# Patient Record
Sex: MALE | Race: WHITE | NOT HISPANIC OR LATINO | Employment: OTHER | URBAN - METROPOLITAN AREA
[De-identification: names, ages, dates, MRNs, and addresses within clinical notes are randomized per-mention and may not be internally consistent; named-entity substitution may affect disease eponyms.]

---

## 2022-08-10 ENCOUNTER — APPOINTMENT (OUTPATIENT)
Dept: CT IMAGING | Facility: HOSPITAL | Age: 87
End: 2022-08-10

## 2022-08-10 ENCOUNTER — HOSPITAL ENCOUNTER (OUTPATIENT)
Facility: HOSPITAL | Age: 87
Setting detail: OBSERVATION
LOS: 1 days | Discharge: HOME | End: 2022-08-12
Attending: EMERGENCY MEDICINE | Admitting: HOSPITALIST

## 2022-08-10 ENCOUNTER — APPOINTMENT (OUTPATIENT)
Dept: GENERAL RADIOLOGY | Facility: HOSPITAL | Age: 87
End: 2022-08-10

## 2022-08-10 DIAGNOSIS — R41.841 COGNITIVE COMMUNICATION DEFICIT: ICD-10-CM

## 2022-08-10 DIAGNOSIS — I63.9 CEREBROVASCULAR ACCIDENT (CVA), UNSPECIFIED MECHANISM: Primary | ICD-10-CM

## 2022-08-10 DIAGNOSIS — D72.825 BANDEMIA: ICD-10-CM

## 2022-08-10 DIAGNOSIS — U07.1 COVID-19 VIRUS INFECTION: ICD-10-CM

## 2022-08-10 DIAGNOSIS — R47.81 SLURRED SPEECH: ICD-10-CM

## 2022-08-10 DIAGNOSIS — R29.810 FACIAL DROOP: ICD-10-CM

## 2022-08-10 PROBLEM — I25.10 CORONARY ARTERY DISEASE: Status: ACTIVE | Noted: 2022-08-10

## 2022-08-10 PROBLEM — I10 ESSENTIAL HYPERTENSION: Status: ACTIVE | Noted: 2022-08-10

## 2022-08-10 PROBLEM — E78.5 HYPERLIPIDEMIA: Status: ACTIVE | Noted: 2022-08-10

## 2022-08-10 PROBLEM — A41.9 SEPSIS: Status: ACTIVE | Noted: 2022-08-10

## 2022-08-10 PROBLEM — I48.0 PAROXYSMAL ATRIAL FIBRILLATION: Status: ACTIVE | Noted: 2022-08-10

## 2022-08-10 PROBLEM — E11.9 TYPE 2 DIABETES MELLITUS: Status: ACTIVE | Noted: 2022-08-10

## 2022-08-10 LAB
ALBUMIN SERPL-MCNC: 3.8 G/DL (ref 3.5–5.2)
ALBUMIN/GLOB SERPL: 1.1 G/DL
ALP SERPL-CCNC: 67 U/L (ref 39–117)
ALT SERPL W P-5'-P-CCNC: 13 U/L (ref 1–41)
ALT SERPL W P-5'-P-CCNC: 13 U/L (ref 1–41)
ANION GAP SERPL CALCULATED.3IONS-SCNC: 13 MMOL/L (ref 5–15)
APTT PPP: 29.7 SECONDS (ref 22–39)
AST SERPL-CCNC: 13 U/L (ref 1–40)
AST SERPL-CCNC: 13 U/L (ref 1–40)
BACTERIA UR QL AUTO: ABNORMAL /HPF
BASOPHILS # BLD AUTO: 0.11 10*3/MM3 (ref 0–0.2)
BASOPHILS NFR BLD AUTO: 0.7 % (ref 0–1.5)
BILIRUB SERPL-MCNC: 0.5 MG/DL (ref 0–1.2)
BILIRUB UR QL STRIP: NEGATIVE
BUN BLDA-MCNC: 26 MG/DL (ref 8–26)
BUN SERPL-MCNC: 27 MG/DL (ref 8–23)
BUN/CREAT SERPL: 16 (ref 7–25)
CA-I BLDA-SCNC: 1.25 MMOL/L (ref 1.2–1.32)
CALCIUM SPEC-SCNC: 9.2 MG/DL (ref 8.6–10.5)
CHLORIDE BLDA-SCNC: 101 MMOL/L (ref 98–109)
CHLORIDE SERPL-SCNC: 102 MMOL/L (ref 98–107)
CLARITY UR: CLEAR
CO2 BLDA-SCNC: 22 MMOL/L (ref 24–29)
CO2 SERPL-SCNC: 22 MMOL/L (ref 22–29)
COLOR UR: YELLOW
CREAT BLDA-MCNC: 1.5 MG/DL (ref 0.6–1.3)
CREAT SERPL-MCNC: 1.69 MG/DL (ref 0.76–1.27)
CRP SERPL-MCNC: 1.22 MG/DL (ref 0–0.5)
D DIMER PPP FEU-MCNC: 0.59 MCGFEU/ML (ref 0.01–0.5)
D-LACTATE SERPL-SCNC: 2.6 MMOL/L (ref 0.5–2)
DEPRECATED RDW RBC AUTO: 44.4 FL (ref 37–54)
EGFRCR SERPLBLD CKD-EPI 2021: 38.8 ML/MIN/1.73
EGFRCR SERPLBLD CKD-EPI 2021: 44.8 ML/MIN/1.73
EOSINOPHIL # BLD AUTO: 0.06 10*3/MM3 (ref 0–0.4)
EOSINOPHIL NFR BLD AUTO: 0.4 % (ref 0.3–6.2)
ERYTHROCYTE [DISTWIDTH] IN BLOOD BY AUTOMATED COUNT: 14.7 % (ref 12.3–15.4)
FLUAV SUBTYP SPEC NAA+PROBE: NOT DETECTED
FLUBV RNA ISLT QL NAA+PROBE: NOT DETECTED
GLOBULIN UR ELPH-MCNC: 3.4 GM/DL
GLUCOSE BLDC GLUCOMTR-MCNC: 312 MG/DL (ref 70–130)
GLUCOSE SERPL-MCNC: 331 MG/DL (ref 65–99)
GLUCOSE UR STRIP-MCNC: ABNORMAL MG/DL
HCT VFR BLD AUTO: 40.3 % (ref 37.5–51)
HCT VFR BLDA CALC: 41 % (ref 38–51)
HGB BLD-MCNC: 13.8 G/DL (ref 13–17.7)
HGB BLDA-MCNC: 13.9 G/DL (ref 12–17)
HGB UR QL STRIP.AUTO: ABNORMAL
HOLD SPECIMEN: NORMAL
HOLD SPECIMEN: NORMAL
HYALINE CASTS UR QL AUTO: ABNORMAL /LPF
IMM GRANULOCYTES # BLD AUTO: 0.07 10*3/MM3 (ref 0–0.05)
IMM GRANULOCYTES NFR BLD AUTO: 0.4 % (ref 0–0.5)
INR PPP: 1.07 (ref 0.84–1.13)
INR PPP: 1.1 (ref 0.8–1.2)
KETONES UR QL STRIP: NEGATIVE
LDH SERPL-CCNC: 154 U/L (ref 135–225)
LEUKOCYTE ESTERASE UR QL STRIP.AUTO: NEGATIVE
LYMPHOCYTES # BLD AUTO: 1.37 10*3/MM3 (ref 0.7–3.1)
LYMPHOCYTES NFR BLD AUTO: 8.8 % (ref 19.6–45.3)
MCH RBC QN AUTO: 28.3 PG (ref 26.6–33)
MCHC RBC AUTO-ENTMCNC: 34.2 G/DL (ref 31.5–35.7)
MCV RBC AUTO: 82.8 FL (ref 79–97)
MONOCYTES # BLD AUTO: 0.89 10*3/MM3 (ref 0.1–0.9)
MONOCYTES NFR BLD AUTO: 5.7 % (ref 5–12)
NEUTROPHILS NFR BLD AUTO: 13.08 10*3/MM3 (ref 1.7–7)
NEUTROPHILS NFR BLD AUTO: 84 % (ref 42.7–76)
NITRITE UR QL STRIP: NEGATIVE
NRBC BLD AUTO-RTO: 0 /100 WBC (ref 0–0.2)
NT-PROBNP SERPL-MCNC: 230.3 PG/ML (ref 0–1800)
PH UR STRIP.AUTO: <=5 [PH] (ref 5–8)
PLATELET # BLD AUTO: 174 10*3/MM3 (ref 140–450)
PMV BLD AUTO: 9.4 FL (ref 6–12)
POTASSIUM BLDA-SCNC: 4.5 MMOL/L (ref 3.5–4.9)
POTASSIUM SERPL-SCNC: 4.6 MMOL/L (ref 3.5–5.2)
PROCALCITONIN SERPL-MCNC: 0.44 NG/ML (ref 0–0.25)
PROT SERPL-MCNC: 7.2 G/DL (ref 6–8.5)
PROT UR QL STRIP: NEGATIVE
PROTHROMBIN TIME: 13.3 SECONDS (ref 12.8–15.2)
PROTHROMBIN TIME: 13.8 SECONDS (ref 11.4–14.4)
RBC # BLD AUTO: 4.87 10*6/MM3 (ref 4.14–5.8)
RBC # UR STRIP: ABNORMAL /HPF
REF LAB TEST METHOD: ABNORMAL
SARS-COV-2 RNA PNL SPEC NAA+PROBE: DETECTED
SODIUM BLD-SCNC: 137 MMOL/L (ref 138–146)
SODIUM SERPL-SCNC: 137 MMOL/L (ref 136–145)
SP GR UR STRIP: 1.06 (ref 1–1.03)
SQUAMOUS #/AREA URNS HPF: ABNORMAL /HPF
TROPONIN T SERPL-MCNC: <0.01 NG/ML (ref 0–0.03)
UROBILINOGEN UR QL STRIP: ABNORMAL
WBC # UR STRIP: ABNORMAL /HPF
WBC CLUMPS # UR AUTO: ABNORMAL /HPF
WBC NRBC COR # BLD: 15.58 10*3/MM3 (ref 3.4–10.8)
WHOLE BLOOD HOLD COAG: NORMAL
WHOLE BLOOD HOLD SPECIMEN: NORMAL

## 2022-08-10 PROCEDURE — 87636 SARSCOV2 & INF A&B AMP PRB: CPT | Performed by: EMERGENCY MEDICINE

## 2022-08-10 PROCEDURE — 93005 ELECTROCARDIOGRAM TRACING: CPT | Performed by: PHYSICIAN ASSISTANT

## 2022-08-10 PROCEDURE — 80053 COMPREHEN METABOLIC PANEL: CPT | Performed by: EMERGENCY MEDICINE

## 2022-08-10 PROCEDURE — 80047 BASIC METABLC PNL IONIZED CA: CPT

## 2022-08-10 PROCEDURE — 0 IOPAMIDOL PER 1 ML: Performed by: EMERGENCY MEDICINE

## 2022-08-10 PROCEDURE — 84484 ASSAY OF TROPONIN QUANT: CPT | Performed by: EMERGENCY MEDICINE

## 2022-08-10 PROCEDURE — 25010000002 CEFTRIAXONE PER 250 MG: Performed by: EMERGENCY MEDICINE

## 2022-08-10 PROCEDURE — 85014 HEMATOCRIT: CPT

## 2022-08-10 PROCEDURE — 71045 X-RAY EXAM CHEST 1 VIEW: CPT

## 2022-08-10 PROCEDURE — 82248 BILIRUBIN DIRECT: CPT | Performed by: PHYSICIAN ASSISTANT

## 2022-08-10 PROCEDURE — 96365 THER/PROPH/DIAG IV INF INIT: CPT

## 2022-08-10 PROCEDURE — 83605 ASSAY OF LACTIC ACID: CPT | Performed by: EMERGENCY MEDICINE

## 2022-08-10 PROCEDURE — 70496 CT ANGIOGRAPHY HEAD: CPT

## 2022-08-10 PROCEDURE — 85610 PROTHROMBIN TIME: CPT | Performed by: PHYSICIAN ASSISTANT

## 2022-08-10 PROCEDURE — 83880 ASSAY OF NATRIURETIC PEPTIDE: CPT | Performed by: INTERNAL MEDICINE

## 2022-08-10 PROCEDURE — 80076 HEPATIC FUNCTION PANEL: CPT | Performed by: PHYSICIAN ASSISTANT

## 2022-08-10 PROCEDURE — 96366 THER/PROPH/DIAG IV INF ADDON: CPT

## 2022-08-10 PROCEDURE — 70498 CT ANGIOGRAPHY NECK: CPT

## 2022-08-10 PROCEDURE — C9803 HOPD COVID-19 SPEC COLLECT: HCPCS

## 2022-08-10 PROCEDURE — 96367 TX/PROPH/DG ADDL SEQ IV INF: CPT

## 2022-08-10 PROCEDURE — 99285 EMERGENCY DEPT VISIT HI MDM: CPT

## 2022-08-10 PROCEDURE — 87040 BLOOD CULTURE FOR BACTERIA: CPT | Performed by: EMERGENCY MEDICINE

## 2022-08-10 PROCEDURE — 82565 ASSAY OF CREATININE: CPT | Performed by: PHYSICIAN ASSISTANT

## 2022-08-10 PROCEDURE — 99204 OFFICE O/P NEW MOD 45 MIN: CPT

## 2022-08-10 PROCEDURE — 25010000002 VANCOMYCIN 10 G RECONSTITUTED SOLUTION: Performed by: EMERGENCY MEDICINE

## 2022-08-10 PROCEDURE — 86140 C-REACTIVE PROTEIN: CPT | Performed by: PHYSICIAN ASSISTANT

## 2022-08-10 PROCEDURE — 99223 1ST HOSP IP/OBS HIGH 75: CPT | Performed by: INTERNAL MEDICINE

## 2022-08-10 PROCEDURE — 85610 PROTHROMBIN TIME: CPT

## 2022-08-10 PROCEDURE — 85025 COMPLETE CBC W/AUTO DIFF WBC: CPT | Performed by: EMERGENCY MEDICINE

## 2022-08-10 PROCEDURE — 85379 FIBRIN DEGRADATION QUANT: CPT | Performed by: PHYSICIAN ASSISTANT

## 2022-08-10 PROCEDURE — 0042T HC CT CEREBRAL PERFUSION W/WO CONTRAST: CPT

## 2022-08-10 PROCEDURE — 85730 THROMBOPLASTIN TIME PARTIAL: CPT | Performed by: EMERGENCY MEDICINE

## 2022-08-10 PROCEDURE — 82728 ASSAY OF FERRITIN: CPT | Performed by: PHYSICIAN ASSISTANT

## 2022-08-10 PROCEDURE — 70450 CT HEAD/BRAIN W/O DYE: CPT

## 2022-08-10 PROCEDURE — 84145 PROCALCITONIN (PCT): CPT | Performed by: PHYSICIAN ASSISTANT

## 2022-08-10 PROCEDURE — 81001 URINALYSIS AUTO W/SCOPE: CPT | Performed by: EMERGENCY MEDICINE

## 2022-08-10 PROCEDURE — 83615 LACTATE (LD) (LDH) ENZYME: CPT | Performed by: PHYSICIAN ASSISTANT

## 2022-08-10 RX ORDER — SODIUM CHLORIDE 9 MG/ML
75 INJECTION, SOLUTION INTRAVENOUS CONTINUOUS
Status: DISCONTINUED | OUTPATIENT
Start: 2022-08-10 | End: 2022-08-11

## 2022-08-10 RX ORDER — PRAVASTATIN SODIUM 40 MG
40 TABLET ORAL DAILY
COMMUNITY

## 2022-08-10 RX ORDER — DEXAMETHASONE SODIUM PHOSPHATE 4 MG/ML
6 INJECTION, SOLUTION INTRA-ARTICULAR; INTRALESIONAL; INTRAMUSCULAR; INTRAVENOUS; SOFT TISSUE DAILY
Status: DISCONTINUED | OUTPATIENT
Start: 2022-08-10 | End: 2022-08-12 | Stop reason: HOSPADM

## 2022-08-10 RX ORDER — PREGABALIN 100 MG/1
225 CAPSULE ORAL 2 TIMES DAILY
COMMUNITY
End: 2022-08-12 | Stop reason: HOSPADM

## 2022-08-10 RX ORDER — AMLODIPINE BESYLATE 10 MG/1
10 TABLET ORAL DAILY
COMMUNITY

## 2022-08-10 RX ORDER — NICOTINE POLACRILEX 4 MG
15 LOZENGE BUCCAL
Status: DISCONTINUED | OUTPATIENT
Start: 2022-08-10 | End: 2022-08-12 | Stop reason: HOSPADM

## 2022-08-10 RX ORDER — QUETIAPINE FUMARATE 50 MG/1
50 TABLET, FILM COATED ORAL NIGHTLY
COMMUNITY

## 2022-08-10 RX ORDER — INSULIN LISPRO 100 [IU]/ML
0-9 INJECTION, SOLUTION INTRAVENOUS; SUBCUTANEOUS
Status: DISCONTINUED | OUTPATIENT
Start: 2022-08-11 | End: 2022-08-11 | Stop reason: SDUPTHER

## 2022-08-10 RX ORDER — SERTRALINE HYDROCHLORIDE 100 MG/1
150 TABLET, FILM COATED ORAL DAILY
COMMUNITY

## 2022-08-10 RX ORDER — DONEPEZIL HYDROCHLORIDE 10 MG/1
10 TABLET, FILM COATED ORAL 2 TIMES DAILY
COMMUNITY
End: 2022-08-12 | Stop reason: HOSPADM

## 2022-08-10 RX ORDER — ASPIRIN 300 MG/1
300 SUPPOSITORY RECTAL DAILY
Status: DISCONTINUED | OUTPATIENT
Start: 2022-08-10 | End: 2022-08-11

## 2022-08-10 RX ORDER — VALSARTAN 160 MG/1
320 TABLET ORAL DAILY
COMMUNITY

## 2022-08-10 RX ORDER — SODIUM CHLORIDE 0.9 % (FLUSH) 0.9 %
10 SYRINGE (ML) INJECTION AS NEEDED
Status: DISCONTINUED | OUTPATIENT
Start: 2022-08-10 | End: 2022-08-12 | Stop reason: HOSPADM

## 2022-08-10 RX ORDER — TRAMADOL HYDROCHLORIDE 50 MG/1
50 TABLET ORAL EVERY 8 HOURS PRN
COMMUNITY

## 2022-08-10 RX ORDER — DEXTROSE MONOHYDRATE 25 G/50ML
25 INJECTION, SOLUTION INTRAVENOUS
Status: DISCONTINUED | OUTPATIENT
Start: 2022-08-10 | End: 2022-08-12 | Stop reason: HOSPADM

## 2022-08-10 RX ORDER — HYDRALAZINE HYDROCHLORIDE 50 MG/1
50 TABLET, FILM COATED ORAL 2 TIMES DAILY
COMMUNITY
End: 2022-08-19 | Stop reason: SDUPTHER

## 2022-08-10 RX ORDER — MIRTAZAPINE 30 MG/1
30 TABLET, FILM COATED ORAL NIGHTLY
COMMUNITY
End: 2022-08-12 | Stop reason: HOSPADM

## 2022-08-10 RX ORDER — ASPIRIN 325 MG
325 TABLET ORAL DAILY
Status: DISCONTINUED | OUTPATIENT
Start: 2022-08-10 | End: 2022-08-11

## 2022-08-10 RX ORDER — GLIPIZIDE 10 MG/1
10 TABLET, FILM COATED, EXTENDED RELEASE ORAL DAILY
COMMUNITY

## 2022-08-10 RX ORDER — MELOXICAM 7.5 MG/1
7.5 TABLET ORAL CONTINUOUS PRN
COMMUNITY
End: 2022-08-12 | Stop reason: HOSPADM

## 2022-08-10 RX ORDER — PANTOPRAZOLE SODIUM 40 MG/1
40 TABLET, DELAYED RELEASE ORAL DAILY
COMMUNITY

## 2022-08-10 RX ORDER — METOPROLOL TARTRATE 100 MG/1
100 TABLET ORAL DAILY
COMMUNITY

## 2022-08-10 RX ADMIN — IOPAMIDOL 115 ML: 755 INJECTION, SOLUTION INTRAVENOUS at 20:46

## 2022-08-10 RX ADMIN — SODIUM CHLORIDE 1000 ML: 9 INJECTION, SOLUTION INTRAVENOUS at 22:12

## 2022-08-10 RX ADMIN — SODIUM CHLORIDE 1 G: 900 INJECTION INTRAVENOUS at 22:13

## 2022-08-10 RX ADMIN — ASPIRIN 300 MG: 300 SUPPOSITORY RECTAL at 23:00

## 2022-08-10 RX ADMIN — VANCOMYCIN HYDROCHLORIDE 2000 MG: 10 INJECTION, POWDER, LYOPHILIZED, FOR SOLUTION INTRAVENOUS at 22:59

## 2022-08-11 ENCOUNTER — APPOINTMENT (OUTPATIENT)
Dept: CARDIOLOGY | Facility: HOSPITAL | Age: 87
End: 2022-08-11

## 2022-08-11 ENCOUNTER — APPOINTMENT (OUTPATIENT)
Dept: MRI IMAGING | Facility: HOSPITAL | Age: 87
End: 2022-08-11

## 2022-08-11 LAB
ALBUMIN SERPL-MCNC: 3.7 G/DL (ref 3.5–5.2)
ALBUMIN SERPL-MCNC: 4.2 G/DL (ref 3.5–5.2)
ALP SERPL-CCNC: 63 U/L (ref 39–117)
ALP SERPL-CCNC: 72 U/L (ref 39–117)
ALT SERPL W P-5'-P-CCNC: 12 U/L (ref 1–41)
ALT SERPL W P-5'-P-CCNC: 13 U/L (ref 1–41)
ANION GAP SERPL CALCULATED.3IONS-SCNC: 13 MMOL/L (ref 5–15)
ASCENDING AORTA: 3.6 CM
AST SERPL-CCNC: 12 U/L (ref 1–40)
AST SERPL-CCNC: 13 U/L (ref 1–40)
BASOPHILS # BLD AUTO: 0.07 10*3/MM3 (ref 0–0.2)
BASOPHILS NFR BLD AUTO: 0.4 % (ref 0–1.5)
BH CV ECHO MEAS - AO MAX PG: 9.8 MMHG
BH CV ECHO MEAS - AO MEAN PG: 5.4 MMHG
BH CV ECHO MEAS - AO ROOT DIAM: 3.4 CM
BH CV ECHO MEAS - AO V2 MAX: 156.4 CM/SEC
BH CV ECHO MEAS - AO V2 VTI: 35.6 CM
BH CV ECHO MEAS - AVA(I,D): 2.5 CM2
BH CV ECHO MEAS - EDV(CUBED): 88.4 ML
BH CV ECHO MEAS - EDV(MOD-SP2): 113 ML
BH CV ECHO MEAS - EDV(MOD-SP4): 124 ML
BH CV ECHO MEAS - EF(MOD-BP): 62.3 %
BH CV ECHO MEAS - EF(MOD-SP2): 65.8 %
BH CV ECHO MEAS - EF(MOD-SP4): 59.4 %
BH CV ECHO MEAS - ESV(CUBED): 24.6 ML
BH CV ECHO MEAS - ESV(MOD-SP2): 38.6 ML
BH CV ECHO MEAS - ESV(MOD-SP4): 50.4 ML
BH CV ECHO MEAS - FS: 34.7 %
BH CV ECHO MEAS - IVS/LVPW: 0.96 CM
BH CV ECHO MEAS - IVSD: 0.81 CM
BH CV ECHO MEAS - LA DIMENSION: 4 CM
BH CV ECHO MEAS - LAT PEAK E' VEL: 9.8 CM/SEC
BH CV ECHO MEAS - LV MASS(C)D: 115.4 GRAMS
BH CV ECHO MEAS - LV MAX PG: 5.6 MMHG
BH CV ECHO MEAS - LV MEAN PG: 2.5 MMHG
BH CV ECHO MEAS - LV V1 MAX: 118.1 CM/SEC
BH CV ECHO MEAS - LV V1 VTI: 24.4 CM
BH CV ECHO MEAS - LVIDD: 4.5 CM
BH CV ECHO MEAS - LVIDS: 2.9 CM
BH CV ECHO MEAS - LVOT AREA: 3.7 CM2
BH CV ECHO MEAS - LVOT DIAM: 2.17 CM
BH CV ECHO MEAS - LVPWD: 0.83 CM
BH CV ECHO MEAS - MED PEAK E' VEL: 6.7 CM/SEC
BH CV ECHO MEAS - MV A MAX VEL: 111.6 CM/SEC
BH CV ECHO MEAS - MV DEC SLOPE: 431.5 CM/SEC2
BH CV ECHO MEAS - MV DEC TIME: 0.18 MSEC
BH CV ECHO MEAS - MV E MAX VEL: 90.8 CM/SEC
BH CV ECHO MEAS - MV E/A: 0.81
BH CV ECHO MEAS - MV MAX PG: 6.2 MMHG
BH CV ECHO MEAS - MV MEAN PG: 2.6 MMHG
BH CV ECHO MEAS - MV P1/2T: 70.4 MSEC
BH CV ECHO MEAS - MV V2 VTI: 37.2 CM
BH CV ECHO MEAS - MVA(P1/2T): 3.1 CM2
BH CV ECHO MEAS - MVA(VTI): 2.43 CM2
BH CV ECHO MEAS - PA ACC TIME: 0.14 SEC
BH CV ECHO MEAS - PA PR(ACCEL): 15.6 MMHG
BH CV ECHO MEAS - PA V2 MAX: 120.7 CM/SEC
BH CV ECHO MEAS - RAP SYSTOLE: 8 MMHG
BH CV ECHO MEAS - RVSP: 16 MMHG
BH CV ECHO MEAS - SV(LVOT): 90.4 ML
BH CV ECHO MEAS - SV(MOD-SP2): 74.4 ML
BH CV ECHO MEAS - SV(MOD-SP4): 73.6 ML
BH CV ECHO MEAS - TAPSE (>1.6): 2.13 CM
BH CV ECHO MEAS - TR MAX PG: 8.2 MMHG
BH CV ECHO MEAS - TR MAX VEL: 143.3 CM/SEC
BH CV ECHO MEASUREMENTS AVERAGE E/E' RATIO: 11.01
BH CV VAS BP LEFT ARM: NORMAL MMHG
BH CV XLRA - RV BASE: 3.9 CM
BH CV XLRA - RV LENGTH: 6.6 CM
BH CV XLRA - RV MID: 3 CM
BH CV XLRA - TDI S': 14.7 CM/SEC
BILIRUB CONJ SERPL-MCNC: <0.2 MG/DL (ref 0–0.3)
BILIRUB CONJ SERPL-MCNC: <0.2 MG/DL (ref 0–0.3)
BILIRUB INDIRECT SERPL-MCNC: NORMAL MG/DL
BILIRUB INDIRECT SERPL-MCNC: NORMAL MG/DL
BILIRUB SERPL-MCNC: 0.3 MG/DL (ref 0–1.2)
BILIRUB SERPL-MCNC: 0.5 MG/DL (ref 0–1.2)
BUN SERPL-MCNC: 24 MG/DL (ref 8–23)
BUN/CREAT SERPL: 17.4 (ref 7–25)
CALCIUM SPEC-SCNC: 9 MG/DL (ref 8.6–10.5)
CHLORIDE SERPL-SCNC: 104 MMOL/L (ref 98–107)
CHOLEST SERPL-MCNC: 146 MG/DL (ref 0–200)
CO2 SERPL-SCNC: 22 MMOL/L (ref 22–29)
CREAT SERPL-MCNC: 1.38 MG/DL (ref 0.76–1.27)
CREAT SERPL-MCNC: 1.53 MG/DL (ref 0.76–1.27)
D-LACTATE SERPL-SCNC: 1.7 MMOL/L (ref 0.5–2)
D-LACTATE SERPL-SCNC: 2.3 MMOL/L (ref 0.5–2)
DEPRECATED RDW RBC AUTO: 43.9 FL (ref 37–54)
EGFRCR SERPLBLD CKD-EPI 2021: 43.7 ML/MIN/1.73
EGFRCR SERPLBLD CKD-EPI 2021: 49.5 ML/MIN/1.73
EOSINOPHIL # BLD AUTO: 0 10*3/MM3 (ref 0–0.4)
EOSINOPHIL NFR BLD AUTO: 0 % (ref 0.3–6.2)
ERYTHROCYTE [DISTWIDTH] IN BLOOD BY AUTOMATED COUNT: 14.7 % (ref 12.3–15.4)
FERRITIN SERPL-MCNC: 30.13 NG/ML (ref 30–400)
GLUCOSE BLDC GLUCOMTR-MCNC: 282 MG/DL (ref 70–130)
GLUCOSE BLDC GLUCOMTR-MCNC: 330 MG/DL (ref 70–130)
GLUCOSE BLDC GLUCOMTR-MCNC: 343 MG/DL (ref 70–130)
GLUCOSE SERPL-MCNC: 295 MG/DL (ref 65–99)
HBA1C MFR BLD: 10.6 % (ref 4.8–5.6)
HCT VFR BLD AUTO: 38.3 % (ref 37.5–51)
HDLC SERPL-MCNC: 37 MG/DL (ref 40–60)
HGB BLD-MCNC: 13.3 G/DL (ref 13–17.7)
HOLD SPECIMEN: NORMAL
IMM GRANULOCYTES # BLD AUTO: 0.08 10*3/MM3 (ref 0–0.05)
IMM GRANULOCYTES NFR BLD AUTO: 0.5 % (ref 0–0.5)
LDLC SERPL CALC-MCNC: 73 MG/DL (ref 0–100)
LDLC/HDLC SERPL: 1.78 {RATIO}
LEFT ATRIUM VOLUME INDEX: 21.4 ML/M2
LYMPHOCYTES # BLD AUTO: 1.29 10*3/MM3 (ref 0.7–3.1)
LYMPHOCYTES NFR BLD AUTO: 7.9 % (ref 19.6–45.3)
MAXIMAL PREDICTED HEART RATE: 133 BPM
MCH RBC QN AUTO: 28.5 PG (ref 26.6–33)
MCHC RBC AUTO-ENTMCNC: 34.7 G/DL (ref 31.5–35.7)
MCV RBC AUTO: 82 FL (ref 79–97)
MONOCYTES # BLD AUTO: 0.31 10*3/MM3 (ref 0.1–0.9)
MONOCYTES NFR BLD AUTO: 1.9 % (ref 5–12)
NEUTROPHILS NFR BLD AUTO: 14.54 10*3/MM3 (ref 1.7–7)
NEUTROPHILS NFR BLD AUTO: 89.3 % (ref 42.7–76)
NRBC BLD AUTO-RTO: 0 /100 WBC (ref 0–0.2)
PLATELET # BLD AUTO: 188 10*3/MM3 (ref 140–450)
PMV BLD AUTO: 9.7 FL (ref 6–12)
POTASSIUM SERPL-SCNC: 4.8 MMOL/L (ref 3.5–5.2)
PROT SERPL-MCNC: 6.7 G/DL (ref 6–8.5)
PROT SERPL-MCNC: 7.3 G/DL (ref 6–8.5)
QT INTERVAL: 382 MS
QTC INTERVAL: 412 MS
RBC # BLD AUTO: 4.67 10*6/MM3 (ref 4.14–5.8)
SODIUM SERPL-SCNC: 139 MMOL/L (ref 136–145)
STRESS TARGET HR: 113 BPM
TRIGL SERPL-MCNC: 215 MG/DL (ref 0–150)
VLDLC SERPL-MCNC: 36 MG/DL (ref 5–40)
WBC NRBC COR # BLD: 16.29 10*3/MM3 (ref 3.4–10.8)

## 2022-08-11 PROCEDURE — 96375 TX/PRO/DX INJ NEW DRUG ADDON: CPT

## 2022-08-11 PROCEDURE — 85025 COMPLETE CBC W/AUTO DIFF WBC: CPT | Performed by: PHYSICIAN ASSISTANT

## 2022-08-11 PROCEDURE — 25010000002 DEXAMETHASONE PER 1 MG: Performed by: PHYSICIAN ASSISTANT

## 2022-08-11 PROCEDURE — 97166 OT EVAL MOD COMPLEX 45 MIN: CPT

## 2022-08-11 PROCEDURE — 63710000001 INSULIN DETEMIR PER 5 UNITS: Performed by: HOSPITALIST

## 2022-08-11 PROCEDURE — 97161 PT EVAL LOW COMPLEX 20 MIN: CPT

## 2022-08-11 PROCEDURE — 92523 SPEECH SOUND LANG COMPREHEN: CPT

## 2022-08-11 PROCEDURE — 80076 HEPATIC FUNCTION PANEL: CPT | Performed by: PHYSICIAN ASSISTANT

## 2022-08-11 PROCEDURE — G0378 HOSPITAL OBSERVATION PER HR: HCPCS

## 2022-08-11 PROCEDURE — 99232 SBSQ HOSP IP/OBS MODERATE 35: CPT | Performed by: HOSPITALIST

## 2022-08-11 PROCEDURE — 80048 BASIC METABOLIC PNL TOTAL CA: CPT | Performed by: PHYSICIAN ASSISTANT

## 2022-08-11 PROCEDURE — 63710000001 INSULIN LISPRO (HUMAN) PER 5 UNITS: Performed by: PHYSICIAN ASSISTANT

## 2022-08-11 PROCEDURE — 83036 HEMOGLOBIN GLYCOSYLATED A1C: CPT

## 2022-08-11 PROCEDURE — 83605 ASSAY OF LACTIC ACID: CPT | Performed by: EMERGENCY MEDICINE

## 2022-08-11 PROCEDURE — 99214 OFFICE O/P EST MOD 30 MIN: CPT | Performed by: STUDENT IN AN ORGANIZED HEALTH CARE EDUCATION/TRAINING PROGRAM

## 2022-08-11 PROCEDURE — 63710000001 INSULIN LISPRO (HUMAN) PER 5 UNITS: Performed by: HOSPITALIST

## 2022-08-11 PROCEDURE — 93306 TTE W/DOPPLER COMPLETE: CPT

## 2022-08-11 PROCEDURE — 25010000002 REMDESIVIR 100 MG/20ML SOLUTION 1 EACH VIAL: Performed by: PHYSICIAN ASSISTANT

## 2022-08-11 PROCEDURE — 93306 TTE W/DOPPLER COMPLETE: CPT | Performed by: INTERNAL MEDICINE

## 2022-08-11 PROCEDURE — 96361 HYDRATE IV INFUSION ADD-ON: CPT

## 2022-08-11 PROCEDURE — 80061 LIPID PANEL: CPT

## 2022-08-11 PROCEDURE — 82962 GLUCOSE BLOOD TEST: CPT

## 2022-08-11 PROCEDURE — 92610 EVALUATE SWALLOWING FUNCTION: CPT

## 2022-08-11 PROCEDURE — 70551 MRI BRAIN STEM W/O DYE: CPT

## 2022-08-11 PROCEDURE — 63710000001 DEXAMETHASONE PER 0.25 MG: Performed by: PHYSICIAN ASSISTANT

## 2022-08-11 RX ORDER — INSULIN LISPRO 100 [IU]/ML
2 INJECTION, SOLUTION INTRAVENOUS; SUBCUTANEOUS
Status: DISCONTINUED | OUTPATIENT
Start: 2022-08-11 | End: 2022-08-12 | Stop reason: HOSPADM

## 2022-08-11 RX ORDER — PANTOPRAZOLE SODIUM 40 MG/1
40 TABLET, DELAYED RELEASE ORAL DAILY
Status: DISCONTINUED | OUTPATIENT
Start: 2022-08-11 | End: 2022-08-12 | Stop reason: HOSPADM

## 2022-08-11 RX ORDER — DEXTROSE MONOHYDRATE 25 G/50ML
25 INJECTION, SOLUTION INTRAVENOUS
Status: DISCONTINUED | OUTPATIENT
Start: 2022-08-11 | End: 2022-08-11 | Stop reason: SDUPTHER

## 2022-08-11 RX ORDER — MIRTAZAPINE 15 MG/1
30 TABLET, FILM COATED ORAL NIGHTLY
Status: DISCONTINUED | OUTPATIENT
Start: 2022-08-11 | End: 2022-08-12 | Stop reason: HOSPADM

## 2022-08-11 RX ORDER — NICOTINE POLACRILEX 4 MG
15 LOZENGE BUCCAL
Status: DISCONTINUED | OUTPATIENT
Start: 2022-08-11 | End: 2022-08-11 | Stop reason: SDUPTHER

## 2022-08-11 RX ORDER — QUETIAPINE FUMARATE 25 MG/1
50 TABLET, FILM COATED ORAL NIGHTLY
Status: DISCONTINUED | OUTPATIENT
Start: 2022-08-11 | End: 2022-08-12 | Stop reason: HOSPADM

## 2022-08-11 RX ORDER — SODIUM CHLORIDE 0.9 % (FLUSH) 0.9 %
10 SYRINGE (ML) INJECTION EVERY 12 HOURS SCHEDULED
Status: DISCONTINUED | OUTPATIENT
Start: 2022-08-11 | End: 2022-08-12 | Stop reason: HOSPADM

## 2022-08-11 RX ORDER — SODIUM CHLORIDE 0.9 % (FLUSH) 0.9 %
10 SYRINGE (ML) INJECTION AS NEEDED
Status: DISCONTINUED | OUTPATIENT
Start: 2022-08-11 | End: 2022-08-12 | Stop reason: HOSPADM

## 2022-08-11 RX ORDER — ATORVASTATIN CALCIUM 40 MG/1
80 TABLET, FILM COATED ORAL NIGHTLY
Status: DISCONTINUED | OUTPATIENT
Start: 2022-08-11 | End: 2022-08-12 | Stop reason: HOSPADM

## 2022-08-11 RX ORDER — INSULIN LISPRO 100 [IU]/ML
0-14 INJECTION, SOLUTION INTRAVENOUS; SUBCUTANEOUS
Status: DISCONTINUED | OUTPATIENT
Start: 2022-08-11 | End: 2022-08-12 | Stop reason: HOSPADM

## 2022-08-11 RX ORDER — DONEPEZIL HYDROCHLORIDE 10 MG/1
10 TABLET, FILM COATED ORAL 2 TIMES DAILY
Status: DISCONTINUED | OUTPATIENT
Start: 2022-08-11 | End: 2022-08-12 | Stop reason: HOSPADM

## 2022-08-11 RX ORDER — CHOLECALCIFEROL (VITAMIN D3) 125 MCG
5 CAPSULE ORAL NIGHTLY PRN
Status: DISCONTINUED | OUTPATIENT
Start: 2022-08-11 | End: 2022-08-12 | Stop reason: HOSPADM

## 2022-08-11 RX ORDER — ASPIRIN 325 MG
325 TABLET, DELAYED RELEASE (ENTERIC COATED) ORAL DAILY
Status: DISCONTINUED | OUTPATIENT
Start: 2022-08-12 | End: 2022-08-12 | Stop reason: HOSPADM

## 2022-08-11 RX ORDER — ACETAMINOPHEN 325 MG/1
650 TABLET ORAL EVERY 4 HOURS PRN
Status: DISCONTINUED | OUTPATIENT
Start: 2022-08-11 | End: 2022-08-12 | Stop reason: HOSPADM

## 2022-08-11 RX ADMIN — DEXAMETHASONE SODIUM PHOSPHATE 6 MG: 4 INJECTION INTRA-ARTICULAR; INTRALESIONAL; INTRAMUSCULAR; INTRAVENOUS; SOFT TISSUE at 01:03

## 2022-08-11 RX ADMIN — INSULIN LISPRO 6 UNITS: 100 INJECTION, SOLUTION INTRAVENOUS; SUBCUTANEOUS at 13:33

## 2022-08-11 RX ADMIN — DONEPEZIL HYDROCHLORIDE 10 MG: 10 TABLET, FILM COATED ORAL at 20:46

## 2022-08-11 RX ADMIN — QUETIAPINE FUMARATE 50 MG: 25 TABLET ORAL at 20:45

## 2022-08-11 RX ADMIN — SERTRALINE HYDROCHLORIDE 150 MG: 100 TABLET ORAL at 08:31

## 2022-08-11 RX ADMIN — MIRTAZAPINE 30 MG: 15 TABLET, FILM COATED ORAL at 20:45

## 2022-08-11 RX ADMIN — SODIUM CHLORIDE 75 ML/HR: 9 INJECTION, SOLUTION INTRAVENOUS at 04:14

## 2022-08-11 RX ADMIN — Medication 5 MG: at 20:46

## 2022-08-11 RX ADMIN — PANTOPRAZOLE SODIUM 40 MG: 40 TABLET, DELAYED RELEASE ORAL at 08:31

## 2022-08-11 RX ADMIN — REMDESIVIR 200 MG: 100 INJECTION, POWDER, LYOPHILIZED, FOR SOLUTION INTRAVENOUS at 04:14

## 2022-08-11 RX ADMIN — INSULIN LISPRO 2 UNITS: 100 INJECTION, SOLUTION INTRAVENOUS; SUBCUTANEOUS at 18:30

## 2022-08-11 RX ADMIN — DEXAMETHASONE 6 MG: 2 TABLET ORAL at 08:32

## 2022-08-11 RX ADMIN — INSULIN LISPRO 7 UNITS: 100 INJECTION, SOLUTION INTRAVENOUS; SUBCUTANEOUS at 08:39

## 2022-08-11 RX ADMIN — DONEPEZIL HYDROCHLORIDE 10 MG: 10 TABLET, FILM COATED ORAL at 08:32

## 2022-08-11 RX ADMIN — INSULIN DETEMIR 5 UNITS: 100 INJECTION, SOLUTION SUBCUTANEOUS at 21:00

## 2022-08-11 RX ADMIN — Medication 10 ML: at 20:46

## 2022-08-11 RX ADMIN — ATORVASTATIN CALCIUM 80 MG: 40 TABLET, FILM COATED ORAL at 20:45

## 2022-08-11 RX ADMIN — INSULIN LISPRO 7 UNITS: 100 INJECTION, SOLUTION INTRAVENOUS; SUBCUTANEOUS at 17:27

## 2022-08-11 NOTE — PLAN OF CARE
Goal Outcome Evaluation:  Plan of Care Reviewed With: patient        Progress: no change  Outcome Evaluation: OT evaluation completed with pt. demonstrating decreased activity tolerance, cognition and balance impacting BADL independence warranting continued skilled OT services.  Pt. needed min A with bed mobility and CGA to stand and mvmt in room, but needed cues for safety and task initiation at times.  Pt. able to doff and nikko socks and groom sinkside with SBA.  Expect pt. will be able to discharge home with 24/7 supervision. May need a  OT home safety assessment if not done priorly.

## 2022-08-11 NOTE — H&P
[facility] Medicine Services  HISTORY AND PHYSICAL    Patient Name: NANCY Benton  : 1934  MRN: [id number]  Primary Care Physician: Provider, No Known  Date of admission: 8/10/2022    Subjective   Subjective     Chief Complaint:  Right facial droop, right sided weakness    HPI:  NANCY Benton is a 87 y.o. male with a history of dementia, CAD s/p stents, HTN, HLD, and T2DM who presents to [facility] ED for complaint of right facial droop and slurred speech. Patient has dementia as baseline, so history has been provided by family. Its reported that patient has intermittent slurred speech at baseline, but that today around 34pm it was much more prominent. They also appreciated a new right facial droop as well. EMS was contacted.  Its reported that they appreciated atrial fib that is apparently a new finding for him. He is not currently on anticoagulation. He denies fever, chest pain, SOB, abdominal pain, nausea, vomiting, or diarrhea. Family member does note that patient was coughing today.          Review of Systems   Constitutional: Negative for fatigue and fever.   Respiratory: Positive for cough. Negative for shortness of breath.    Cardiovascular: Negative for chest pain and palpitations.   Gastrointestinal: Negative for abdominal pain, diarrhea, nausea and vomiting.   Skin: Negative.    Neurological: Positive for facial asymmetry, speech difficulty and weakness (Right side). Negative for tremors and syncope.   Psychiatric/Behavioral: Positive for confusion. The patient is not nervous/anxious.       All other systems reviewed and are negative.     Personal History     Past Medical History:   Diagnosis Date   • CAD (coronary artery disease)    • Dementia (HCC)          No past surgical history on file.    Family History:  family history includes No Known Problems in his father and mother. Otherwise pertinent FHx was reviewed and unremarkable.     Social History:  reports that he has  never smoked. He does not have any smokeless tobacco history on file.  Social History     Social History Narrative   • Not on file       Medications:       No Known Allergies    Objective   Objective     Vital Signs:   Temp:  [98.6 °F (37 °C)] 98.6 °F (37 °C)  Heart Rate:  [73-81] 73  Resp:  [20] 20  BP: (120-133)/(58-76) 120/58    Physical Exam  Constitutional:       General: He is not in acute distress.     Appearance: Normal appearance.   HENT:      Head: Atraumatic.      Right Ear: External ear normal.      Left Ear: External ear normal.      Nose: Nose normal.      Mouth/Throat:      Mouth: Mucous membranes are dry.   Eyes:      Extraocular Movements: Extraocular movements intact.      Conjunctiva/sclera: Conjunctivae normal.      Pupils: Pupils are equal, round, and reactive to light.   Cardiovascular:      Rate and Rhythm: Normal rate and regular rhythm.      Pulses: Normal pulses.      Heart sounds: Normal heart sounds. No murmur heard.  Pulmonary:      Effort: Pulmonary effort is normal. No respiratory distress.      Breath sounds: Normal breath sounds. No wheezing, rhonchi or rales.   Abdominal:      General: Bowel sounds are normal. There is no distension.      Tenderness: There is no abdominal tenderness. There is no guarding or rebound.   Musculoskeletal:         General: Normal range of motion.      Cervical back: No rigidity.      Right lower leg: No edema.      Left lower leg: No edema.   Skin:     General: Skin is warm and dry.      Coloration: Skin is not jaundiced.      Findings: No lesion or rash.   Neurological:      Mental Status: He is alert. He is disoriented.      Comments: Alert and oriented to self only. Unable to give correct year. Reportedly is baseline for him. Right facial droop noted.  strength slightly diminished on right compared to left.    Psychiatric:         Attention and Perception: Attention normal.         Mood and Affect: Mood normal.         Behavior: Behavior normal.          Thought Content: Thought content normal.          Results Reviewed:  I have personally reviewed most recent indicated data and agree with findings including:  [x]  Laboratory  [x]  Radiology  [x]  EKG/Telemetry  []  Pathology  []  Cardiac/Vascular Studies  []  Old records  []  Other:      LAB RESULTS:      Lab 08/10/22  2042 08/10/22  2041 08/10/22  2039   WBC 15.58*  --   --    HEMOGLOBIN 13.8  --   --    HEMOGLOBIN, POC  --  13.9  --    HEMATOCRIT 40.3  --   --    HEMATOCRIT POC  --  41  --    PLATELETS 174  --   --    NEUTROS ABS 13.08*  --   --    IMMATURE GRANS (ABS) 0.07*  --   --    LYMPHS ABS 1.37  --   --    MONOS ABS 0.89  --   --    EOS ABS 0.06  --   --    MCV 82.8  --   --    PROCALCITONIN 0.44*  --   --    LACTATE 2.6*  --   --    PROTIME  --   --  13.3   APTT 29.7  --   --          Lab 08/10/22  2042 08/10/22  2041   SODIUM 137  --    POTASSIUM 4.6  --    CHLORIDE 102  --    CO2 22.0  --    ANION GAP 13.0  --    BUN 27*  --    CREATININE 1.69* 1.50*   EGFR 38.8* 44.8*   GLUCOSE 331*  --    CALCIUM 9.2  --          Lab 08/10/22  2042   TOTAL PROTEIN 7.2   ALBUMIN 3.80   GLOBULIN 3.4   ALT (SGPT) 13  13   AST (SGOT) 13  13   BILIRUBIN 0.5   ALK PHOS 67         Lab 08/10/22  2042 08/10/22  2039   PROBNP 230.3  --    TROPONIN T <0.010  --    PROTIME  --  13.3   INR  --  1.1                 Brief Urine Lab Results  (Last result in the past 365 days)      Color   Clarity   Blood   Leuk Est   Nitrite   Protein   CREAT   Urine HCG        08/10/22 2123 Yellow   Clear   Moderate (2+)   Negative   Negative   Negative               Microbiology Results (last 10 days)     Procedure Component Value - Date/Time    COVID PRE-OP / PRE-PROCEDURE SCREENING ORDER (NO ISOLATION) - Swab, Nasopharynx [[id number]]  (Abnormal) Collected: 08/10/22 2122    Lab Status: Final result Specimen: Swab from Nasopharynx Updated: 08/10/22 2214    Narrative:      The following orders were created for panel order COVID PRE-OP /  PRE-PROCEDURE SCREENING ORDER (NO ISOLATION) - Swab, Nasopharynx.  Procedure                               Abnormality         Status                     ---------                               -----------         ------                     COVID-19 and FLU A/B PCR...[[id number]]  Abnormal            Final result                 Please view results for these tests on the individual orders.    COVID-19 and FLU A/B PCR - Swab, Nasopharynx [[id number]]  (Abnormal) Collected: 08/10/22 2122    Lab Status: Final result Specimen: Swab from Nasopharynx Updated: 08/10/22 2214     COVID19 Detected     Influenza A PCR Not Detected     Influenza B PCR Not Detected    Narrative:      Fact sheet for providers: https://www.fda.gov/media/098911/download    Fact sheet for patients: https://www.fda.gov/media/623238/download    Test performed by PCR.  Influenza A and Influenza B negative results should be considered presumptive in samples that have a positive SARS-CoV-2 result.    Competitive inhibition studies showed that SARS-CoV-2 virus, when present at concentrations above 3.6E+04 copies/mL, can inhibit the detection and amplification of influenza A and influenza B virus RNA if present at or below 1.8E+02 copies/mL or 4.9E+02 copies/mL, respectively, and may lead to false negative influenza virus results. If co-infection with influenza A or influenza B virus is suspected in samples with a positive SARS-CoV-2 result, the sample should be re-tested with another FDA cleared, approved, or authorized influenza test, if influenza virus detection would change clinical management.          CT Angiogram Neck    Result Date: 8/10/2022  DATE OF EXAM: 8/10/2022 8:37 PM  PROCEDURE: CT CEREBRAL PERFUSION W WO CONTRAST-, CT ANGIOGRAM NECK-, CT ANGIOGRAM HEAD W AI ANALYSIS OF LVO-  INDICATIONS: Neuro deficit, acute, stroke suspected  COMPARISON: Noncontrast CT head performed concurrently  TECHNIQUE: Routine transaxial cuts were obtained through the  head without administration of contrast. Routine transaxial cuts were then obtained through the head following the intravenous administration of 115 mL of Isovue 370. Core blood volume, core blood flow, mean transit time, and Tmax images were obtained utilizing the Rapid software protocol. A limited CT angiogram of the head was also performed to measure the blood vessel density.  Axial IV arterial phase contrast-enhanced CT angiogram of the head and neck. Three-dimensional reconstructions were postprocessed.  AI analysis of LVO was also performed  The radiation dose reduction device was turned on for each scan per the ALARA (As Low as Reasonably Achievable) protocol.  FINDINGS: CT perfusion: CT perfusion demonstrates no evidence of core infarct or significant territorial ischemic tissue at risk.  CT ANGIOGRAM: The lung apices are grossly clear. Evaluation of the neck soft tissues demonstrates no pathologic cervical adenopathy or unexpected soft tissue neck mass. The osseous structures demonstrate no evidence of acute fracture or aggressive osseous lesion. On the right, there is no significant atherosclerotic narrowing of the ICA origin, 0% by NASCET criteria. Similar 0% narrowing is present on the left. The vertebral arteries are normal in course and caliber, minimally diminutive on the left. Intracranially, the carotid siphons demonstrate multifocal moderate calcific atherosclerotic change, with some focal moderate narrowing of the left cavernous segment. The anterior cerebral arteries are normal in course and caliber. The right middle cerebral artery demonstrates no evidence of flow-limiting stenosis, large vessel occlusion or aneurysmal dilatation. The left middle cerebral artery is also normal in course and caliber. There is some focal narrowing of the diminutive left intradural vertebral artery. Patent basilar artery and right vertebral artery. The posterior cerebral arteries are normal in course and caliber  bilaterally.      Impression: CT perfusion demonstrates no evidence of core infarct or significant territorial ischemic tissue at risk.  CT angiogram demonstrates no evidence of high-grade flow limiting stenosis, large vessel occlusion or aneurysmal dilatation. There is focal moderate narrowing of the left intracranial ICA, in addition to focal moderate to severe narrowing of the otherwise diminutive intradural left vertebral artery.  This report was finalized on 8/10/2022 9:06 PM by Jeff Aguilar.      XR Chest 1 View    Result Date: 8/10/2022  DATE OF EXAM: 8/10/2022 8:45 PM  PROCEDURE: XR CHEST 1 VW-  INDICATIONS: Acute Stroke Protocol (onset < 12 hrs)  COMPARISON: No comparisons available.  TECHNIQUE: Single radiographic AP view of the chest was obtained.  FINDINGS: Moderate chronic changes of the lung fields are present without focal airspace opacity. There is no significant pleural effusion or distinct pneumothorax. The heart appears enlarged.      Impression: Moderate chronic interstitial changes of the lung fields without evidence of acute cardiopulmonary abnormality.  This report was finalized on 8/10/2022 9:40 PM by Jeff Aguilar.      CT Head Without Contrast Stroke Protocol    Result Date: 8/10/2022  DATE OF EXAM: 8/10/2022 8:32 PM  PROCEDURE: CT HEAD WO CONTRAST STROKE PROTOCOL-  INDICATIONS: Neuro deficit, acute, stroke suspected  COMPARISON: No comparisons available.  TECHNIQUE: Routine transaxial and coronal reconstruction images were obtained through the head without the administration of contrast. Automated exposure control and iterative reconstruction methods were used.  The radiation dose reduction device was turned on for each scan per the ALARA (As Low as Reasonably Achievable) protocol.  FINDINGS: Gray-white differentiation is maintained and there is no evidence of intracranial hemorrhage, mass or mass effect. Age-related changes of the brain are present including volume loss and typical  periventricular sequela of chronic small vessel ischemia. There is otherwise no evidence of intracranial hemorrhage, mass or mass effect. The ventricles are normal in size and configuration accounting for surrounding volume loss. The orbits are normal and the paranasal sinuses are grossly clear.      Impression: Age-related changes of the brain as above, otherwise without evidence of acute intracranial abnormality.  Scan performed 8:32 PM 8/10/2022, results related to the stroke team via the CT technologist by Jeff Aguilar at 8:37 PM same day   This report was finalized on 8/10/2022 8:38 PM by Jeff Aguilar.      CT Angiogram Head w AI Analysis of LVO    Result Date: 8/10/2022  DATE OF EXAM: 8/10/2022 8:37 PM  PROCEDURE: CT CEREBRAL PERFUSION W WO CONTRAST-, CT ANGIOGRAM NECK-, CT ANGIOGRAM HEAD W AI ANALYSIS OF LVO-  INDICATIONS: Neuro deficit, acute, stroke suspected  COMPARISON: Noncontrast CT head performed concurrently  TECHNIQUE: Routine transaxial cuts were obtained through the head without administration of contrast. Routine transaxial cuts were then obtained through the head following the intravenous administration of 115 mL of Isovue 370. Core blood volume, core blood flow, mean transit time, and Tmax images were obtained utilizing the Rapid software protocol. A limited CT angiogram of the head was also performed to measure the blood vessel density.  Axial IV arterial phase contrast-enhanced CT angiogram of the head and neck. Three-dimensional reconstructions were postprocessed.  AI analysis of LVO was also performed  The radiation dose reduction device was turned on for each scan per the ALARA (As Low as Reasonably Achievable) protocol.  FINDINGS: CT perfusion: CT perfusion demonstrates no evidence of core infarct or significant territorial ischemic tissue at risk.  CT ANGIOGRAM: The lung apices are grossly clear. Evaluation of the neck soft tissues demonstrates no pathologic cervical adenopathy or  unexpected soft tissue neck mass. The osseous structures demonstrate no evidence of acute fracture or aggressive osseous lesion. On the right, there is no significant atherosclerotic narrowing of the ICA origin, 0% by NASCET criteria. Similar 0% narrowing is present on the left. The vertebral arteries are normal in course and caliber, minimally diminutive on the left. Intracranially, the carotid siphons demonstrate multifocal moderate calcific atherosclerotic change, with some focal moderate narrowing of the left cavernous segment. The anterior cerebral arteries are normal in course and caliber. The right middle cerebral artery demonstrates no evidence of flow-limiting stenosis, large vessel occlusion or aneurysmal dilatation. The left middle cerebral artery is also normal in course and caliber. There is some focal narrowing of the diminutive left intradural vertebral artery. Patent basilar artery and right vertebral artery. The posterior cerebral arteries are normal in course and caliber bilaterally.      Impression: CT perfusion demonstrates no evidence of core infarct or significant territorial ischemic tissue at risk.  CT angiogram demonstrates no evidence of high-grade flow limiting stenosis, large vessel occlusion or aneurysmal dilatation. There is focal moderate narrowing of the left intracranial ICA, in addition to focal moderate to severe narrowing of the otherwise diminutive intradural left vertebral artery.  This report was finalized on 8/10/2022 9:06 PM by Jeff Aguilar.      CT CEREBRAL PERFUSION WITH & WITHOUT CONTRAST    Result Date: 8/10/2022  DATE OF EXAM: 8/10/2022 8:37 PM  PROCEDURE: CT CEREBRAL PERFUSION W WO CONTRAST-, CT ANGIOGRAM NECK-, CT ANGIOGRAM HEAD W AI ANALYSIS OF LVO-  INDICATIONS: Neuro deficit, acute, stroke suspected  COMPARISON: Noncontrast CT head performed concurrently  TECHNIQUE: Routine transaxial cuts were obtained through the head without administration of contrast.  Routine transaxial cuts were then obtained through the head following the intravenous administration of 115 mL of Isovue 370. Core blood volume, core blood flow, mean transit time, and Tmax images were obtained utilizing the Rapid software protocol. A limited CT angiogram of the head was also performed to measure the blood vessel density.  Axial IV arterial phase contrast-enhanced CT angiogram of the head and neck. Three-dimensional reconstructions were postprocessed.  AI analysis of LVO was also performed  The radiation dose reduction device was turned on for each scan per the ALARA (As Low as Reasonably Achievable) protocol.  FINDINGS: CT perfusion: CT perfusion demonstrates no evidence of core infarct or significant territorial ischemic tissue at risk.  CT ANGIOGRAM: The lung apices are grossly clear. Evaluation of the neck soft tissues demonstrates no pathologic cervical adenopathy or unexpected soft tissue neck mass. The osseous structures demonstrate no evidence of acute fracture or aggressive osseous lesion. On the right, there is no significant atherosclerotic narrowing of the ICA origin, 0% by NASCET criteria. Similar 0% narrowing is present on the left. The vertebral arteries are normal in course and caliber, minimally diminutive on the left. Intracranially, the carotid siphons demonstrate multifocal moderate calcific atherosclerotic change, with some focal moderate narrowing of the left cavernous segment. The anterior cerebral arteries are normal in course and caliber. The right middle cerebral artery demonstrates no evidence of flow-limiting stenosis, large vessel occlusion or aneurysmal dilatation. The left middle cerebral artery is also normal in course and caliber. There is some focal narrowing of the diminutive left intradural vertebral artery. Patent basilar artery and right vertebral artery. The posterior cerebral arteries are normal in course and caliber bilaterally.      Impression: CT perfusion  demonstrates no evidence of core infarct or significant territorial ischemic tissue at risk.  CT angiogram demonstrates no evidence of high-grade flow limiting stenosis, large vessel occlusion or aneurysmal dilatation. There is focal moderate narrowing of the left intracranial ICA, in addition to focal moderate to severe narrowing of the otherwise diminutive intradural left vertebral artery.  This report was finalized on 8/10/2022 9:06 PM by Jeff Aguilar.            Assessment & Plan   Assessment & Plan       Cerebrovascular accident (CVA), unspecified mechanism (HCC)    Sepsis (HCC)    COVID    Essential hypertension    Hyperlipidemia    Paroxysmal atrial fibrillation (HCC)    Type 2 diabetes mellitus (HCC)    Coronary artery disease    NANCY Benton is a 87 y.o. male with a history of dementia, CAD s/p stents, HTN, HLD, and T2DM who presents to [facility] ED for complaint of right facial droop and slurred speech.    Right facial droop.   CVA?  -Patient currently stable and in no acute distress. VSS on room air  -Stroke team following  -CT head shows age-related changes. Otherwise negative.   -CTA head/neck/perfusion shows focal moderate narrowing of the left intracranial ICA, in addition to focal moderate-severe narrowing of the intradural left vertebral artery.  -MRI pending  -Echo in the am  -NPO for now pending swallow eval  -PT/OT in the am  -Allow permissive HTN for now, SBP <220  -Neuro checks  -Fall precautions  ? afib    Sepsis  COVID  -Covid positive today. He is reportedly fully vaccinated. Family member noted that patient had a mild cough today, but is otherwise asymptomatic for Covid.  -Source of infection, leukocytosis, elevated lactate, elevated procal.  -Blood cultures pending  -Start remdesivir and Decadron  -Covid labs pending  -Isolation precautions  -UA not very impressive for a UTI. Negative for bacteria and nitrites. Does show some WBCs but is also very contaminated. Consider repeat via  I&O cath.  -Started on Vanc + Ceftriaxone in the ED. Will continue for tonight and reassess in the am.  -1L NS bolus x2 ordered in the ED  -IV fluids overnight    CAD s/p stents  HTN  HLD  -Allow permissive HTN for tonight per stroke team, SBP <220  -Continue statin  -Lipid panel in the am    Parox Atrial Fib?  -EMS appreciated new onset a fib when arriving on scene. Daughter states he has no history of it and is not on anticoagulation.   -may need heart monitor outpatient  -EKG pending.    Dementia    T2DM  -Blood glucose markedly elevated at 331  -Check A1C  -Fingerstick achs. SSI  -Diabetes Educator to see in the am    Acute Kidney Injury vs CKD  -Creatinine 1.69 today. Unclear on his baseline. Will try to obtain records in the am  -eGFR 38.8  -Received 1L Ns bolus x2 in the ED  -Gentle IV fluids overnight  -Avoid nephrotoxic agents  -Repeat bmp in the am          DVT prophylaxis:  SCDS    CODE STATUS:  DNR/DNI       This note has been completed as part of a split-shared workflow.     Signature: Electronically signed by Reji Shultz PA-C, 08/10/22, 10:07 PM EDT      Attending   Admission Attestation       I have seen and examined the patient, performing an independent face-to-face diagnostic evaluation with plan of care reviewed and developed with the advanced practice clinician (APC).      Brief Summary Statement:   NANCY Benton is a 87 y.o. male with past medical history of essential hypertension, hyperlipidemia, type 2 diabetes, coronary artery disease with history of stents presents the ER due to concern for stroke with right facial droop and slurred speech.    Patient has dementia so history is limited and provided by the family.  Patient began having intermittent slurred speech today at 3 or 4 PM.  He was noted to have new right-sided facial droop.  Reportedly, found to have possible A. fib by EMS.    Incidentally found to have Covid-19. Reports mild cough but denies SOA    Remainder of detailed  HPI is as noted by APC and has been reviewed and/or edited by me for completeness.    Attending Physical Exam:  Constitutional: Awake, alert  Eyes: PERRLA, sclerae anicteric, no conjunctival injection  HENT: NCAT, mucous membranes dry  Neck: Supple, no thyromegaly, no lymphadenopathy, trachea midline  Respiratory: Clear to auscultation bilaterally, nonlabored respirations   Cardiovascular: RRR, no murmurs, rubs, or gallops, palpable pedal pulses bilaterally  Gastrointestinal: Positive bowel sounds, soft, nontender, nondistended  Musculoskeletal: No bilateral ankle edema, no clubbing or cyanosis to extremities  Psychiatric: Appropriate affect, cooperative  Neurologic: Oriented x 1, strength symmetric in all extremities, facial droop noted,  strength decreased on right, speech slurred   Skin: No rashes      Brief Assessment/Plan :  See detailed assessment and plan developed with APC which I have reviewed and/or edited for completeness.        Admission Status: I believe that this patient meets INPATIENT status due to stroke, covid 19.  I feel patient’s risk for adverse outcomes and need for care warrant INPATIENT evaluation and I predict the patient’s care encounter to likely last beyond 2 midnights.        Gurvinder Youngblood,   08/10/22

## 2022-08-11 NOTE — THERAPY EVALUATION
"Acute Care - Speech Language Pathology Initial Evaluation  [facility]   Cognitive-Communication Evaluation  Clinical Swallow Evaluation     Patient Name: NANCY Benton  : 1934  MRN: [id number]  Today's Date: 2022               Admit Date: 8/10/2022     Visit Dx:    ICD-10-CM ICD-9-CM   1. Cerebrovascular accident (CVA), unspecified mechanism (HCC)  I63.9 434.91   2. Facial droop  R29.810 781.94   3. Slurred speech  R47.81 784.59   4. Bandemia  D72.825 288.66   5. COVID-19 virus infection  U07.1 079.89   6. Cognitive communication deficit  R41.841 799.52     Patient Active Problem List   Diagnosis   • Cerebrovascular accident (CVA), unspecified mechanism (HCC)   • Essential hypertension   • Hyperlipidemia   • Paroxysmal atrial fibrillation (HCC)   • Sepsis (HCC)   • COVID   • Type 2 diabetes mellitus (HCC)   • Coronary artery disease     Past Medical History:   Diagnosis Date   • Arthritis    • CAD (coronary artery disease)    • Dementia (HCC)    • Diabetes mellitus (HCC)    • Hypertension    • Lumbar degenerative disc disease      History reviewed. No pertinent surgical history.    SLP Recommendation and Plan  SLP Diagnosis: Mild-moderate cog-comm deficits. Seemingly improving compared to reports just prior to admission. Will contine to follow to 1) determine baseline and 2) tx if symptoms do not continue to resolve. (22)        Swallow Criteria for Skilled Therapeutic Interventions Met: no problems identified which require skilled intervention (22)  SLC Criteria for Skilled Therapy Interventions Met: yes (22)  Anticipated Discharge Disposition (SLP): unknown (22)  Demonstrates Need for Referral to Another Service: gastroenterology, other (see comments) (consider OP GI consult as appropriate for suspected chronic esophageal dysphagia/GERD. Pt notes \"sticking\" with solids though manages well with avoiding/modifying tough/dry foods.) (22)  Therapy " Frequency (Swallow): evaluation only (08/11/22 0815)  Predicted Duration Therapy Intervention (Days): until discharge (08/11/22 0815)                    Plan of Care Reviewed With: patient (08/11/22 0940)      SLP EVALUATION (last 72 hours)     SLP SLC Evaluation     Row Name 08/11/22 0815                   Communication Assessment/Intervention    Document Type evaluation  -SM        Subjective Information no complaints  -SM        Patient Observations alert;cooperative  -                  General Information    Patient Profile Reviewed yes  -SM        Pertinent History Of Current Problem R droop, R weakness, aphasia, dysarthria, AMS. Covid +. MRI pending.  -SM        Prior Level of Function-Communication unknown  -SM        Plans/Goals Discussed with patient;agreed upon  -        Barriers to Rehab none identified  -        Patient's Goals for Discharge patient did not state  -                  Pain    Additional Documentation Pain Scale: Numbers Pre/Post-Treatment (Group)  -                  Pain Scale: Numbers Pre/Post-Treatment    Pretreatment Pain Rating 0/10 - no pain  -SM        Posttreatment Pain Rating 0/10 - no pain  -SM                  Comprehension Assessment/Intervention    Comprehension Assessment/Intervention Auditory Comprehension  -SM                  Auditory Comprehension Assessment/Intervention    Auditory Comprehension (Communication) mild impairment  -SM        Answers Questions (Communication) yes/no;wh questions;personal;simple;mild impairment;mulit-unit;complex;abstract;moderate impairment  -SM        Able to Follow Commands (Communication) 1-step;mild impairment  -SM        Narrative Discourse conversational level;mild impairment  -SM        Successful Auditory Strategies (Communication) repetition  -SM                  Expression Assessment/Intervention    Expression Assessment/Intervention verbal expression  -SM                  Verbal Expression Assessment/Intervention     "Conversational Discourse/Fluency mild impairment  -SM        Verbal Expression, Comment mild decreased in conversation though able to convey wants/needs. Also intermittent confusion  -                  Oral Musculature and Cranial Nerve Assessment    Oral Motor General Assessment WFL  -SM                  Motor Speech Assessment/Intervention    Motor Speech Function WFL  -SM                  Cognitive Assessment Intervention- SLP    Orientation Status (Cognition) person;WFL;place;mild impairment;time;situation;moderate impairment  \"I don't have to keep up with the date anymore\"  -        Memory (Cognitive) mental manipulation;moderate impairment  -SM        Attention (Cognitive) sustained;mild impairment  -SM        Problem Solving (Cognitive) simple;moderate impairment;other (see comments)  cues for call bell  -        Executive Function (Cognition) deficit awareness;mild impairment;moderate impairment  -SM                  SLP Evaluation Clinical Impressions    SLP Diagnosis Mild-moderate cog-comm deficits. Seemingly improving compared to reports just prior to admission. Will contine to follow to 1) determine baseline and 2) tx if symptoms do not continue to resolve.  -        Rehab Potential/Prognosis good  -[facility] Criteria for Skilled Therapy Interventions Met yes  -SM        Functional Impact difficulty in expressing complex messages;difficulty completing home management task  -                  Recommendations    Therapy Frequency (SLP SLC) PRN;5 days per week  -        Predicted Duration Therapy Intervention (Days) until discharge  -        Anticipated Discharge Disposition (SLP) unknown  -              User Key  (r) = Recorded By, (t) = Taken By, (c) = Cosigned By    Initials Name Effective Dates     Kandi Mirza MS CCC-SLP 06/16/21 -                    EDUCATION  The patient has been educated in the following areas:     Cognitive Impairment Communication " Impairment.                      Time Calculation:      Time Calculation- SLP     Row Name 22 0941             Time Calculation- SLP    SLP Start Time 0815  -SM      SLP Received On 22  -SM              Untimed Charges    31871-PC Eval Speech and Production w/ Language Minutes 40  -SM      41188-LL Eval Oral Pharyng Swallow Minutes 25  -SM              Total Minutes    Untimed Charges Total Minutes 65  -SM       Total Minutes 65  -SM            User Key  (r) = Recorded By, (t) = Taken By, (c) = Cosigned By    Initials Name Provider Type    Kandi Alcaraz, MS CCC-SLP Speech and Language Pathologist                Therapy Charges for Today     Code Description Service Date Service Provider Modifiers Qty    98609122526 HC ST EVAL ORAL PHARYNG SWALLOW 2 2022 Kandi Mirza MS CCC-SLP GN 1    20297585757 HC ST EVAL SPEECH AND PROD W LANG  3 2022 Kandi Mirza MS CCC-SLP GN 1                     Kandi Mirza MS CCC-SLP  2022 and Acute Care - Speech Language Pathology   Swallow Initial Evaluation [facility]     Patient Name: NANCY Benton  : 1934  MRN: [id number]  Today's Date: 2022               Admit Date: 8/10/2022    Visit Dx:     ICD-10-CM ICD-9-CM   1. Cerebrovascular accident (CVA), unspecified mechanism (HCC)  I63.9 434.91   2. Facial droop  R29.810 781.94   3. Slurred speech  R47.81 784.59   4. Bandemia  D72.825 288.66   5. COVID-19 virus infection  U07.1 079.89   6. Cognitive communication deficit  R41.841 799.52     Patient Active Problem List   Diagnosis   • Cerebrovascular accident (CVA), unspecified mechanism (HCC)   • Essential hypertension   • Hyperlipidemia   • Paroxysmal atrial fibrillation (HCC)   • Sepsis (HCC)   • COVID   • Type 2 diabetes mellitus (HCC)   • Coronary artery disease     Past Medical History:   Diagnosis Date   • Arthritis    • CAD (coronary artery disease)    • Dementia (HCC)    • Diabetes mellitus (HCC)    •  "Hypertension    • Lumbar degenerative disc disease      History reviewed. No pertinent surgical history.    SLP Recommendation and Plan  SLP Swallowing Diagnosis: swallow WFL, other (see comments) (suspect esophageal dysphagia/GERD) (08/11/22 0815)  SLP Diet Recommendation: regular textures, thin liquids, other (see comments) (add gravy to trays for use as needed with tough/dry solids) (08/11/22 0815)  Recommended Precautions and Strategies: reflux precautions (08/11/22 0815)  SLP Rec. for Method of Medication Administration: as tolerated (08/11/22 0815)     Monitor for Signs of Aspiration: notify SLP if any concerns (08/11/22 0815)     Swallow Criteria for Skilled Therapeutic Interventions Met: no problems identified which require skilled intervention (08/11/22 0815)  Anticipated Discharge Disposition (SLP): unknown (08/11/22 0815)     Therapy Frequency (Swallow): evaluation only (08/11/22 0815)  Predicted Duration Therapy Intervention (Days): until discharge (08/11/22 0815)  Demonstrates Need for Referral to Another Service: gastroenterology, other (see comments) (consider OP GI consult as appropriate for suspected chronic esophageal dysphagia/GERD. Pt notes \"sticking\" with solids though manages well with avoiding/modifying tough/dry foods.) (08/11/22 0815)                               Plan of Care Reviewed With: patient      SWALLOW EVALUATION (last 72 hours)     SLP Adult Swallow Evaluation     Row Name 08/11/22 0815                   Clinical Swallow Eval    Oral Prep Phase WFL  -SM        Oral Transit WFL  -SM        Oral Residue WFL  -SM        Pharyngeal Phase no overt signs/symptoms of pharyngeal impairment  -SM        Esophageal Phase suspected esophageal impairment  -SM                  Esophageal Phase Concerns    Esophageal Phase Concerns belching;sensation of material sticking  -SM                  SLP Evaluation Clinical Impression    SLP Swallowing Diagnosis swallow WFL;other (see comments)  suspect " "esophageal dysphagia/GERD  -        Swallow Criteria for Skilled Therapeutic Interventions Met no problems identified which require skilled intervention  -                  Recommendations    Therapy Frequency (Swallow) evaluation only  -        SLP Diet Recommendation regular textures;thin liquids;other (see comments)  add gravy to trays for use as needed with tough/dry solids  -        Recommended Precautions and Strategies reflux precautions  -        Oral Care Recommendations Oral Care BID/PRN  -        SLP Rec. for Method of Medication Administration as tolerated  -        Monitor for Signs of Aspiration notify SLP if any concerns  -        Demonstrates Need for Referral to Another Service gastroenterology;other (see comments)  consider OP GI consult as appropriate for suspected chronic esophageal dysphagia/GERD. Pt notes \"sticking\" with solids though manages well with avoiding/modifying tough/dry foods.  -              User Key  (r) = Recorded By, (t) = Taken By, (c) = Cosigned By    Initials Name Effective Dates    Kandi Alcaraz MS CCC-SLP 06/16/21 -                 EDUCATION  The patient has been educated in the following areas:   Dysphagia (Swallowing Impairment) Modified Diet Instruction.              Time Calculation:    Time Calculation- SLP     Row Name 08/11/22 0941             Time Calculation- SLP    SLP Start Time 0815  -      SLP Received On 08/11/22  -              Untimed Charges    57618-AN Eval Speech and Production w/ Language Minutes 40  -SM      84912-ML Eval Oral Pharyng Swallow Minutes 25  -SM              Total Minutes    Untimed Charges Total Minutes 65  -SM       Total Minutes 65  -SM            User Key  (r) = Recorded By, (t) = Taken By, (c) = Cosigned By    Initials Name Provider Type    Kandi Alcaraz MS CCC-SLP Speech and Language Pathologist                Therapy Charges for Today     Code Description Service Date Service Provider " Modifiers Qty    38531240877  ST EVAL ORAL PHARYNG SWALLOW 2 8/11/2022 Kandi Mirza, MS CCC-SLP GN 1    83375864086 HC ST EVAL SPEECH AND PROD W LANG  3 8/11/2022 Kandi Mirza, MS CCC-SLP GN 1            Patient was not wearing a face mask and did not exhibit coughing during this therapy encounter.  Procedure performed was aerosolizing, involved close contact (within 6 feet for at least 15 minutes or longer), and did not involve contact with infectious secretions or specimens.  Therapist used appropriate personal protective equipment including gloves, standard procedure mask, eye protection, gown and N95 mask.  Appropriate PPE was worn during the entire therapy session.  Hand hygiene was completed before and after therapy session.       Kandi Mirza, MS [place]-SLP  8/11/2022

## 2022-08-11 NOTE — PLAN OF CARE
Goal Outcome Evaluation:  Plan of Care Reviewed With: patient        Progress: no change (PT eval)  Outcome Evaluation: Patient prensent with weakness, decreased activity tolerance, and cognitive deficits affecting functional mobility. He required CGA to ambulating around the room with RW, requiring frequent cues for safe placement of RW. VSS on 2L O2. Skilled IP PT warranted. Anticipate that pt will be appropriate to discharge home with HHPT but will need 24/7 supervision. Will monitor progress closely.

## 2022-08-11 NOTE — THERAPY EVALUATION
Patient Name: NANCY Benton  : 1934    MRN: [id number]                              Today's Date: 2022       Admit Date: 8/10/2022    Visit Dx:     ICD-10-CM ICD-9-CM   1. Cerebrovascular accident (CVA), unspecified mechanism (HCC)  I63.9 434.91   2. Facial droop  R29.810 781.94   3. Slurred speech  R47.81 784.59   4. Bandemia  D72.825 288.66   5. COVID-19 virus infection  U07.1 079.89   6. Cognitive communication deficit  R41.841 799.52     Patient Active Problem List   Diagnosis   • Cerebrovascular accident (CVA), unspecified mechanism (HCC)   • Essential hypertension   • Hyperlipidemia   • Paroxysmal atrial fibrillation (HCC)   • Sepsis (HCC)   • COVID   • Type 2 diabetes mellitus (HCC)   • Coronary artery disease     Past Medical History:   Diagnosis Date   • Arthritis    • CAD (coronary artery disease)    • Dementia (HCC)    • Diabetes mellitus (HCC)    • Hypertension    • Lumbar degenerative disc disease      History reviewed. No pertinent surgical history.   General Information     Row Name 22          Physical Therapy Time and Intention    Document Type evaluation  -NS     Mode of Treatment physical therapy  -NS     Row Name 22          General Information    Patient Profile Reviewed yes  -NS     Prior Level of Function independent:;all household mobility;gait;transfer;bed mobility;ADL's  Pt uses a rollator at baseline  -NS     Existing Precautions/Restrictions fall;oxygen therapy device and L/min  dementia  -NS     Barriers to Rehab previous functional deficit;cognitive status  -NS     Row Name 22          Living Environment    People in Home child(meghan), adult  Per RN, pt's daughter is in a few hospital rooms down from patient.  -NS     Row Name 22          Home Main Entrance    Number of Stairs, Main Entrance --  TBA further  -NS     Row Name 22          Stairs Within Home, Primary    Number of Stairs, Within Home, Primary none  -NS     Row  Name 08/11/22 0827          Cognition    Orientation Status (Cognition) oriented to;person;disoriented to;time;verbal cues/prompts needed for orientation;place  Pt knew he was in the hospital but was unable to state which one.  -NS     Row Name 08/11/22 0827          Safety Issues, Functional Mobility    Safety Issues Affecting Function (Mobility) insight into deficits/self-awareness;positioning of assistive device;safety precaution awareness;safety precautions follow-through/compliance;sequencing abilities  -NS     Impairments Affecting Function (Mobility) balance;cognition;coordination;endurance/activity tolerance;strength;postural/trunk control;motor planning  -NS           User Key  (r) = Recorded By, (t) = Taken By, (c) = Cosigned By    Initials Name Provider Type    Yesi Quevedo PT Physical Therapist               Mobility     Row Name 08/11/22 0827          Bed Mobility    Bed Mobility supine-sit  -NS     Supine-Sit [place] (Bed Mobility) minimum assist (75% patient effort)  -NS     Row Name 08/11/22 0827          Transfers    Comment, (Transfers) Cues for hand placement  -NS     Row Name 08/11/22 0827          Sit-Stand Transfer    Sit-Stand [place] (Transfers) contact guard;verbal cues  -NS     Assistive Device (Sit-Stand Transfers) walker, front-wheeled  -NS     Row Name 08/11/22 0827          Gait/Stairs (Locomotion)    [place] Level (Gait) contact guard;verbal cues  -NS     Assistive Device (Gait) walker, front-wheeled  -NS     Distance in Feet (Gait) 20+20  -NS     Deviations/Abnormal Patterns (Gait) nel decreased;festinating/shuffling;gait speed decreased;stride length decreased  -NS     Bilateral Gait Deviations forward flexed posture  -NS     Comment, (Gait/Stairs) Patient ambulated at slow pace around room, requiring frequent cues for safety placement of RW and for stepping up into RW for increased safety. VSS on 2L O2.  -NS           User Key  (r) = Recorded By, (t) = Taken  By, (c) = Cosigned By    Initials Name Provider Type    Yesi Quevedo PT Physical Therapist               Obj/Interventions     Row Name 08/11/22 0827          Range of Motion Comprehensive    General Range of Motion bilateral lower extremity ROM WFL  -NS     Row Name 08/11/22 0827          Strength Comprehensive (MMT)    General Manual Muscle Testing (MMT) Assessment lower extremity strength deficits identified  -NS     Comment, General Manual Muscle Testing (MMT) Assessment B ankle DF: 4-/5, B knee ext: 4/5, symmetrical  -NS     Row Name 08/11/22 0827          Motor Skills    Motor Skills coordination  -NS     Coordination bilateral;heel to shin;minimal impairment  symmetrical  -NS     Row Name 08/11/22 0827          Balance    Balance Assessment sitting static balance;sitting dynamic balance;standing static balance;standing dynamic balance  -NS     Static Sitting Balance supervision  -NS     Dynamic Sitting Balance contact guard  -NS     Position, Sitting Balance unsupported;sitting edge of bed  -NS     Static Standing Balance contact guard  -NS     Dynamic Standing Balance contact guard  -NS     Position/Device Used, Standing Balance supported;walker, front-wheeled  -NS     Row Name 08/11/22 0827          Sensory Assessment (Somatosensory)    Sensory Assessment (Somatosensory) LE sensation intact  -NS           User Key  (r) = Recorded By, (t) = Taken By, (c) = Cosigned By    Initials Name Provider Type    Yesi Quevedo PT Physical Therapist               Goals/Plan     Row Name 08/11/22 0827          Bed Mobility Goal 1 (PT)    Activity/Assistive Device (Bed Mobility Goal 1, PT) sit to supine/supine to sit  -NS     [place] Level/Cues Needed (Bed Mobility Goal 1, PT) supervision required  -NS     Time Frame (Bed Mobility Goal 1, PT) long term goal (LTG);2 weeks  -NS     Row Name 08/11/22 0827          Transfer Goal 1 (PT)    Activity/Assistive Device (Transfer Goal 1, PT)  sit-to-stand/stand-to-sit;bed-to-chair/chair-to-bed  -NS     [place] Level/Cues Needed (Transfer Goal 1, PT) supervision required  -NS     Time Frame (Transfer Goal 1, PT) long term goal (LTG);2 weeks  -NS     Row Name 08/11/22 0827          Gait Training Goal 1 (PT)    Activity/Assistive Device (Gait Training Goal 1, PT) gait (walking locomotion);assistive device use  -NS     [place] Level (Gait Training Goal 1, PT) supervision required  -NS     Distance (Gait Training Goal 1, PT) 150  -NS     Time Frame (Gait Training Goal 1, PT) long term goal (LTG);2 weeks  -NS     Row Name 08/11/22 0827          Therapy Assessment/Plan (PT)    Planned Therapy Interventions (PT) balance training;bed mobility training;gait training;home exercise program;neuromuscular re-education;patient/family education;strengthening;transfer training  -NS           User Key  (r) = Recorded By, (t) = Taken By, (c) = Cosigned By    Initials Name Provider Type    Yesi Quevedo, PT Physical Therapist               Clinical Impression     Row Name 08/11/22 0827          Pain    Pretreatment Pain Rating 0/10 - no pain  -NS     Posttreatment Pain Rating 0/10 - no pain  -NS     Row Name 08/11/22 0827          Plan of Care Review    Plan of Care Reviewed With patient  -NS     Progress no change  PT eval  -NS     Outcome Evaluation Patient prensent with weakness, decreased activity tolerance, and cognitive deficits affecting functional mobility. He required CGA to ambulating around the room with RW, requiring frequent cues for safe placement of RW. VSS on 2L O2. Skilled IP PT warranted. Anticipate that pt will be appropriate to discharge home with HHPT but will need 24/7 supervision. Will monitor progress closely.  -NS     Row Name 08/11/22 0827          Therapy Assessment/Plan (PT)    Patient/Family Therapy Goals Statement (PT) to go home  -NS     Rehab Potential (PT) good, to achieve stated therapy goals  -NS     Criteria for Skilled  Interventions Met (PT) yes;meets criteria;skilled treatment is necessary  -NS     Therapy Frequency (PT) daily  -NS     Predicted Duration of Therapy Intervention (PT) 2 weeks  -NS     Row Name 08/11/22 0827          Vital Signs    Pre Systolic BP Rehab 136  -NS     Pre Treatment Diastolic BP 70  -NS     Post Systolic BP Rehab 153  -NS     Post Treatment Diastolic BP 93  -NS     Pretreatment Heart Rate (beats/min) 66  -NS     Posttreatment Heart Rate (beats/min) 63  -NS     Pre SpO2 (%) 96  -NS     O2 Delivery Pre Treatment nasal cannula  -NS     Post SpO2 (%) 97  -NS     O2 Delivery Post Treatment nasal cannula  -NS     Pre Patient Position Supine  -NS     Intra Patient Position Standing  -NS     Post Patient Position Sitting  -NS     Row Name 08/11/22 0827          Positioning and Restraints    Pre-Treatment Position in bed  -NS     Post Treatment Position chair  -NS     In Chair notified nsg;reclined;call light within reach;encouraged to call for assist;exit alarm on;legs elevated;waffle cushion;heels elevated  -NS           User Key  (r) = Recorded By, (t) = Taken By, (c) = Cosigned By    Initials Name Provider Type    Yesi Quevedo, PT Physical Therapist               Outcome Measures     Row Name 08/11/22 0827          How much help from another person do you currently need...    Turning from your back to your side while in flat bed without using bedrails? 4  -NS     Moving from lying on back to sitting on the side of a flat bed without bedrails? 3  -NS     Moving to and from a bed to a chair (including a wheelchair)? 3  -NS     Standing up from a chair using your arms (e.g., wheelchair, bedside chair)? 3  -NS     Climbing 3-5 steps with a railing? 2  -NS     To walk in hospital room? 3  -NS     AM-PAC 6 Clicks Score (PT) 18  -NS     Highest level of mobility 6 --> Walked 10 steps or more  -NS     Row Name 08/11/22 0827          Functional Assessment    Outcome Measure Options AM-PAC 6 Clicks Basic Mobility  (PT)  -NS           User Key  (r) = Recorded By, (t) = Taken By, (c) = Cosigned By    Initials Name Provider Type    Yesi Quevedo PT Physical Therapist                             Physical Therapy Education                 Title: PT OT SLP Therapies (In Progress)     Topic: Physical Therapy (In Progress)     Point: Mobility training (Done)     Learning Progress Summary           Patient Acceptance, E, VU,NR by NS at 8/11/2022 0942                   Point: Home exercise program (Not Started)     Learner Progress:  Not documented in this visit.          Point: Body mechanics (Done)     Learning Progress Summary           Patient Acceptance, E, VU,NR by NS at 8/11/2022 0942                   Point: Precautions (Done)     Learning Progress Summary           Patient Acceptance, E, VU,NR by NS at 8/11/2022 0942                               User Key     Initials Effective Dates Name Provider Type Discipline    NS 06/16/21 -  Yesi Goldman PT Physical Therapist PT              PT Recommendation and Plan  Planned Therapy Interventions (PT): balance training, bed mobility training, gait training, home exercise program, neuromuscular re-education, patient/family education, strengthening, transfer training  Plan of Care Reviewed With: patient  Progress: no change (PT eval)  Outcome Evaluation: Patient prensent with weakness, decreased activity tolerance, and cognitive deficits affecting functional mobility. He required CGA to ambulating around the room with RW, requiring frequent cues for safe placement of RW. VSS on 2L O2. Skilled IP PT warranted. Anticipate that pt will be appropriate to discharge home with HHPT but will need 24/7 supervision. Will monitor progress closely.     Time Calculation:    PT Charges     Row Name 08/11/22 0827             Time Calculation    Start Time 0827  -NS      PT Received On 08/11/22  -NS      PT Goal Re-Cert Due Date 08/21/22  -NS              Untimed Charges    PT Eval/Re-eval Minutes  46  -NS              Total Minutes    Untimed Charges Total Minutes 46  -NS       Total Minutes 46  -NS            User Key  (r) = Recorded By, (t) = Taken By, (c) = Cosigned By    Initials Name Provider Type    Yesi Quevedo, PT Physical Therapist              Therapy Charges for Today     Code Description Service Date Service Provider Modifiers Qty    36848129476 HC PT EVAL LOW COMPLEXITY 4 8/11/2022 Yesi Goldman, PT GP 1          PT G-Codes  Outcome Measure Options: AM-PAC 6 Clicks Basic Mobility (PT)  AM-PAC 6 Clicks Score (PT): 18    Yesi Goldman PT  8/11/2022

## 2022-08-11 NOTE — CASE MANAGEMENT/SOCIAL WORK
Discharge Planning Assessment  [facility]     Patient Name: NANCY Benton  MRN: [id number]  Today's Date: 8/11/2022    Admit Date: 8/10/2022     Discharge Needs Assessment     Row Name 08/11/22 1413       Living Environment    People in Home child(meghan), adult    Name(s) of People in Home Jacquie- dtr    Current Living Arrangements home    Primary Care Provided by child(meghan)    Provides Primary Care For no one, unable/limited ability to care for self    Family Caregiver if Needed child(meghan), adult    Family Caregiver Names Jacquie- dtr    Quality of Family Relationships involved;supportive    Able to Return to Prior Arrangements yes       Resource/Environmental Concerns    Resource/Environmental Concerns none    Transportation Concerns none       Transition Planning    Patient/Family Anticipates Transition to home with family;home with help/services    Patient/Family Anticipated Services at Transition     Transportation Anticipated family or friend will provide       Discharge Needs Assessment    Equipment Currently Used at Home cane, straight;walker, rolling    Concerns to be Addressed discharge planning    Anticipated Changes Related to Illness inability to care for self    Equipment Needed After Discharge none    Provided Post Acute Provider List? N/A               Discharge Plan     Row Name 08/11/22 1416       Plan    Plan TBD    Plan Comments Spoke with daughter. Lives with daughter and her SO in [place] Assist x1 with ADL's. Uses a cane and has a RW. Current plan is to dc to home with family. However, dtr is pt in 614 with fx hip. Family can provide transport.    Final Discharge Disposition Code 30 - still a patient              Continued Care and Services - Admitted Since 8/10/2022    Coordination has not been started for this encounter.          Demographic Summary    No documentation.                Functional Status     Row Name 08/11/22 1412       Functional Status    Usual Activity  Tolerance fair       Assessment of Health Literacy    How often do you have someone help you read hospital materials? Always    How often do you have problems learning about your medical condition because of difficulty understanding written information? Always    How often do you have a problem understanding what is told to you about your medical condition? Always    How confident are you filling out medical forms by yourself? Not at all    Health Literacy Low       Functional Status, IADL    Medications independent    Meal Preparation independent    Housekeeping independent    Laundry independent    Shopping independent       Mental Status Summary    Recent Changes in Mental Status/Cognitive Functioning unable to assess               Psychosocial    No documentation.                Abuse/Neglect    No documentation.                Legal    No documentation.                Substance Abuse    No documentation.                Patient Forms     Row Name 08/11/22 1401       Patient Forms    Patient Observation Letter Attempted    Details of attempted delivery Neither pt nor daughter available via phone                  Marj Porras RN

## 2022-08-11 NOTE — PLAN OF CARE
Goal Outcome Evaluation:      VSS. NSR on tele. 2L NC. Pt oriented to self only, no facial droop noted with equal . Pt attempted to climb out of bed several times today, easily redirected.

## 2022-08-11 NOTE — CONSULTS
Stroke Consult Note    Patient Name: CRISTOPHER Benton   MRN: [id number]  Age: 87 y.o.  Sex: male  : 1934    Primary Care Physician: Provider, No Known  Referring Physician:  Emerson Cuellar MD    TIME STROKE TEAM CALLED:  EST     TIME PATIENT SEEN:  EST    Handedness: Right  Race:     Chief Complaint/Reason for Consultation: Right facial droop, right sided weakness    HPI: GUERITA Benton is an 87 year old male with known medical history of dementia, hypertension, and diabetes who presents with right facial droop and right-sided weakness.  Per EMS the family noticed a right-sided facial droop around 1830 this evening, however upon talking to the daughter she tells me that the patient started having slurred speech around 1300 today. The daughter tells me that her father will intermittently have slurred speech but that at 1300 today it was much worse than his normal and that it persisted, and that later in the evening she noticed the right-sided facial droop.  The daughter also tells me that the patient was recently started on a new medication to treat his diabetes that is increased his urinary frequency, she is unsure what the medication is.  Per EMS glucose was 339 and blood pressure 128/76.  His daughter tells me that over the last few days his blood pressure has been elevated with home readings of systolic blood pressure 170-180s.  EMS also noted atrial fibrillation on their monitor, per the daughter the patient does not have a known diagnosis of atrial fibrillation.    Patient is met emergently in CT scan, NIH stroke scale 6 for right facial droop, dysarthria, aphasia, dysmetria of left upper extremity.  Horizontal nystagmus noted.  PERRL, patient reacts appropriately to visual threat in all visual fields, patient is drowsy and oriented to person only.  EMS had appreciated weakness of right hand when gripping, upon my evaluation patient demonstrates generalized weakness but no focal weakness of  any extremity, equal  strength, equal dorsiflexion and plantarflexion of bilateral lower extremities.    CT head negative for acute intracranial abnormality, no hemorrhage  CTA head and neck demonstrates no evidence of high-grade flow-limiting stenosis, large vessel occlusion or aneurysmal dilatation.  There is focal moderate narrowing of the left intracranial ICA and focal moderate to severe narrowing of the diminutive intradural left vertebral artery.        Last Known Normal Date/Time: 8/10/2022 1300 EST     Review of Systems   Unable to perform ROS: Dementia      No past medical history on file.  No past surgical history on file.  No family history on file.  Social History     Socioeconomic History   • Marital status:      No Known Allergies  Prior to Admission medications    Not on File         Temp:  [98.6 °F (37 °C)] 98.6 °F (37 °C)  Heart Rate:  [81] 81  Resp:  [20] 20  BP: (128-133)/(63-76) 133/63     Neurological Exam  Mental Status  Awake and alert. Oriented only to person. Moderate dysarthria present. Mixed aphasia present.    Cranial Nerves  CN II: Visual fields full to confrontation.  CN III, IV, VI: Nystagmus present: Horizontal nystagmus. Normal lids and orbits bilaterally. Pupils equal round and reactive to light bilaterally.  CN V: Facial sensation is normal.  CN VII:  Right: There is central facial weakness.  CN VIII: Hard of hearing.  CN IX, X: Palate elevates symmetrically  CN XI: Shoulder shrug strength is normal.  CN XII: Tongue midline without atrophy or fasciculations.    Motor  Decreased muscle bulk throughout. No fasciculations present. Normal muscle tone.  RUE 4/5  LUE 4/5  RLE 4/5  LLE 4/5.    Sensory  Light touch is normal in upper and lower extremities.     Reflexes                                            Right                      Left  Plantar                           Downgoing                Downgoing    Coordination  Right: Finger-to-nose normal.Left: Finger-to-nose  abnormality: Mild dysmetria noted.  Patient unable to follow multi-step commands to assess dysmetria of BLE.    Gait    Not observed secondary to confusion and trouble following commands. Daughter tells me that at baseline his mobility is limited and that he uses a walker when he ambulates.      Physical Exam  Vitals reviewed.   Constitutional:       General: He is awake.   HENT:      Head: Normocephalic and atraumatic.      Mouth/Throat:      Mouth: Mucous membranes are dry.      Pharynx: Oropharynx is clear.   Eyes:      General: Lids are normal.      Extraocular Movements: Nystagmus present.      Pupils: Pupils are equal, round, and reactive to light.   Cardiovascular:      Rate and Rhythm: Normal rate and regular rhythm.      Comments: EMS reported atrial fibrillation on their monitor, per telemetry patient appears to be in NSR at this time, 12 lead EKG pending  Pulmonary:      Effort: No respiratory distress.      Comments: 3L NC, coughing  Musculoskeletal:      Right lower leg: No edema.      Left lower leg: No edema.   Skin:     General: Skin is warm and dry.   Neurological:      Mental Status: He is alert. He is disoriented.      Cranial Nerves: Cranial nerve deficit and dysarthria present.      Motor: Weakness present.      Coordination: Coordination abnormal.         Acute Stroke Data    Thrombolytic Inclusion / Exclusion Criteria    Time: 21:27 EDT  Person Administering Scale: WHITNEY Joyner    Inclusion Criteria  [x]   18 years of age or greater   []   Onset of symptoms < 4.5 hours before beginning treatment (stroke onset = time patient was last seen well or without symptoms).   []   Diagnosis of acute ischemic stroke causing measurable disabling deficit (Complete Hemianopia, Any Aphasia, Visual or Sensory Extinction, Any weakness limiting sustained effort against gravity)   []   Any remaining deficit considered potentially disabling in view of patient and practitioner   Exclusion criteria (Do  not proceed with Alteplase if any are checked under exclusion criteria)  [x]   Onset unknown or GREATER than 4.5 hours   []   ICH on CT/MRI   []   CT demonstrates hypodensity representing acute or subacute infarct   []   Significant head trauma or prior stroke in the previous 3 months   []   Symptoms suggestive of subarachnoid hemorrhage   []   History of un-ruptured intracranial aneurysm GREATER than 10 mm   []   Recent intracranial or intraspinal surgery within the last 3 months   []   Arterial puncture at a non-compressible site in the previous 7 days   []   Active internal bleeding   []   Acute bleeding tendency   []   Platelet count LESS than 100,000 for known hematological diseases such as leukemia, thrombocytopenia or chronic cirrhosis   []   Current use of anticoagulant with INR GREATER than 1.7 or PT GREATER than 15 seconds, aPTT GREATER than 40 seconds   []   Heparin received within 48 hours, resulting in abnormally elevated aPTT GREATER than upper limit of normal   []   Current use of direct thrombin inhibitors or direct factor Xa inhibitors in the past 48 hours   []   Elevated blood pressure refractory to treatment (systolic GREATER than 185 mm/Hg or diastolic  GREATER than 110 mm/Hg   []   Suspected infective endocarditis and aortic arch dissection   []   Current use of therapeutic treatment dose of low-molecular-weight heparin (LMWH) within the previous 24 hours   []   Structural GI malignancy or bleed   Relative exclusion for all patients  []   Only minor non-disabling symptoms   []   Pregnancy   []   Seizure at onset with postictal residual neurological impairments   []   Major surgery or previous trauma within past 14 days   []   History of previous spontaneous ICH, intracranial neoplasm, or AV malformation   []   Postpartum (within previous 14 days)   []   Recent GI or urinary tract hemorrhage (within previous 21 days)   []   Recent acute MI (within previous 3 months)   []   History of un-ruptured  intracranial aneurysm LESS than 10 mm   []   History of ruptured intracranial aneurysm   []   Blood glucose LESS than 50 mg/dL (2.7 mmol/L)   []   Dural puncture within the last 7 days   []   Known GREATER than 10 cerebral microbleeds   Additional exclusions for patients with symptoms onset between 3 and 4.5 hours.  []   Age > 80.   []   On any anticoagulants regardless of INR  >>> Warfarin (Coumadin), Heparin, Enoxaparin (Lovenox), fondaparinux (Arixtra), bivalirudin (Angiomax), Argatroban, dabigatran (Pradaxa), rivaroxaban (Xarelto), or apixaban (Eliquis)   []   Severe stroke (NIHSS > 25).   []   History of BOTH diabetes and previous ischemic stroke.   []   The risks and benefits have been discussed with the patient or family related to the administration of IV thrombolytic therapy for stroke symptoms.   []   I have discussed and reviewed the patient's case and imaging with the attending prior to IV thrombolytic therapy.   N/A Time IV thrombolytic administered       Hospital Meds:  Scheduled-    Infusions-     PRNs- •  sodium chloride    Functional Status Prior to Current Stroke/Vernon Score: 4    NIH Stroke Scale  Time: 21:27 EDT  Person Administering Scale: WHITNEY Joyner    Interval: baseline  1a. Level of Consciousness: 1-->Not alert, but arousable by minor stimulation to obey, answer, or respond  1b. LOC Questions: 1-->Answers one question correctly  1c. LOC Commands: 0-->Performs both tasks correctly  2. Best Gaze: 0-->Normal  3. Visual: 0-->No visual loss  4. Facial Palsy: 1-->Minor paralysis (flattened nasolabial fold, asymmetry on smiling)  5a. Motor Arm, Left: 0-->No drift, limb holds 90 (or 45) degrees for full 10 secs  5b. Motor Arm, Right: 0-->No drift, limb holds 90 (or 45) degrees for full 10 secs  6a. Motor Leg, Left: 0-->No drift, leg holds 30 degree position for full 5 secs  6b. Motor Leg, Right: 0-->No drift, leg holds 30 degree position for full 5 secs  7. Limb Ataxia: 1-->Present in  one limb  8. Sensory: 0-->Normal, no sensory loss  9. Best Language: 1-->Mild-to-moderate aphasia, some obvious loss of fluency or facility of comprehension, without significant limitation on ideas expressed or form of expression. Reduction of speech and/or comprehension, however, makes conversation. . . (see row details)  10. Dysarthria: 1-->Mild-to-moderate dysarthria, patient slurs at least some words and, at worst, can be understood with some difficulty  11. Extinction and Inattention (formerly Neglect): 0-->No abnormality    Total (NIH Stroke Scale): 6    Results Reviewed:  I have personally reviewed current lab, radiology, and data and agree with results.     CT Angiogram Neck    Result Date: 8/10/2022  CT perfusion demonstrates no evidence of core infarct or significant territorial ischemic tissue at risk.  CT angiogram demonstrates no evidence of high-grade flow limiting stenosis, large vessel occlusion or aneurysmal dilatation. There is focal moderate narrowing of the left intracranial ICA, in addition to focal moderate to severe narrowing of the otherwise diminutive intradural left vertebral artery.  This report was finalized on 8/10/2022 9:06 PM by Jeff Aguilar.      CT Head Without Contrast Stroke Protocol    Result Date: 8/10/2022  Age-related changes of the brain as above, otherwise without evidence of acute intracranial abnormality.  Scan performed 8:32 PM 8/10/2022, results related to the stroke team via the CT technologist by Jeff Aguilar at 8:37 PM same day   This report was finalized on 8/10/2022 8:38 PM by Jeff Aguilar.      CT Angiogram Head w AI Analysis of LVO    Result Date: 8/10/2022  CT perfusion demonstrates no evidence of core infarct or significant territorial ischemic tissue at risk.  CT angiogram demonstrates no evidence of high-grade flow limiting stenosis, large vessel occlusion or aneurysmal dilatation. There is focal moderate narrowing of the left intracranial ICA, in  addition to focal moderate to severe narrowing of the otherwise diminutive intradural left vertebral artery.  This report was finalized on 8/10/2022 9:06 PM by Jeff Aguilar.      CT CEREBRAL PERFUSION WITH & WITHOUT CONTRAST    Result Date: 8/10/2022  CT perfusion demonstrates no evidence of core infarct or significant territorial ischemic tissue at risk.  CT angiogram demonstrates no evidence of high-grade flow limiting stenosis, large vessel occlusion or aneurysmal dilatation. There is focal moderate narrowing of the left intracranial ICA, in addition to focal moderate to severe narrowing of the otherwise diminutive intradural left vertebral artery.  This report was finalized on 8/10/2022 9:06 PM by Jeff Aguilar.      CT head negative for acute intracranial abnormality, no hemorrhage  CTA head and neck demonstrates no evidence of high-grade flow-limiting stenosis, large vessel occlusion or aneurysmal dilatation.  There is focal moderate narrowing of the left intracranial ICA and focal moderate to severe narrowing of the diminutive intradural left vertebral artery.    Glucose 331  Sodium 137  Creatinine 1.69  eGFR 38.8  ALT 13  AST 13  Pro time 13.3  INR 1.1  PTT 29.7  Lactate 2.6  WBC 15.58  Urinalysis specific gravity 1.059, positive for glucose and blood  Repeat UA specific gravity 1.059, positive for glucose, blood, RBC, WBC, and squamous epithelial cells    Assessment/Plan:    GUERITA Benton is an 87 year old male with known medical history of dementia, hypertension, and diabetes who presents with right facial droop and right-sided weakness.  Per EMS the family noticed a right-sided facial droop around 1830 this evening, however upon talking to the daughter she tells me that the patient started having slurred speech around 1300 today.  The daughter tells me that her father will intermittently have slurred speech but that at 1300 today it was much worse than his normal and that it persisted, and that  later in the evening she noticed the right-sided facial droop.      Patient is not a candidate for IV TNKase d/t LKW >4.5H  Patient is not a candidate for emergent endovascular intervention d/t no LVO or flow limiting stenosis on CTA/CTP    Antiplatelet PTA: None  Anticoagulant PTA: None        1. Right facial droop, dysarthria  1. Differential diagnosis includes CVA versus infectious process (UA suspicious for UTI, ED physician has begun treatment with IV Rocephin and Vancomycin)  2. Recommend admission to hospitalist medicine for further stroke work-up  3. Will initiate TIA/ischemic stroke order set without thrombolytic therapy  4. MRI brain without contrast  5.  mg  6. TTE, no bubble study  7. N.p.o. pending SLP evaluation, when cleared recommend cardiac consistent carb diet  8. Activity as tolerated, PT/OT to work with patient in a.m.  9. Fasting lipid panel  10. Lipitor 80 mg  2. Hypertension  1. Allow for permissive hypertension, SBP< 220  2. Hold home antihypertensives at this time  3. Type 2 Diabetes  1. A1c  2. Management per hospitalist    Plan of care discussed with ED physician, Dr. Cuellar, patient's daughter, and with bedside nursing staff.    Thank you for allowing us to participate in the care of this patient, stroke neurology will continue to follow.      Jeannine Hurd, WHITNEY  August 10, 2022  21:27 EDT

## 2022-08-11 NOTE — PLAN OF CARE
Goal Outcome Evaluation:  Plan of Care Reviewed With: patient         SLP evaluation completed. Safe for regular diet, suspect underlying mild esophageal dysphagia. Will continue to address cognitive-comm deficits, if does not continue to resolve. Please see note for further details and recommendations.

## 2022-08-11 NOTE — PROGRESS NOTES
[facility] Medicine Services  PROGRESS NOTE    Patient Name: NANCY Benton  : 1934  MRN: [id number]    Date of Admission: 8/10/2022  Primary Care Physician: Provider, No Known    Subjective   Subjective     CC:   Weakness / facial droop    HPI:   Sitting up today.  Unknown PCP.  Denies dizziness on standing.  No facial droop observed.  History of multiple falls.  Has low level of health literacy.  Not a great historian.  Denies chills and denies fevers.  Discussed that his MRI is negative for stroke.  Oriented to person mainly, time somewhat, place not so much    ROS:    Gen- denies chills, denies fevers  CV- No chest pain, palpitations  Resp- No cough, dyspnea  GI- No N/V/D, abd pain    Objective   Objective     Vital Signs:   Temp:  [97.8 °F (36.6 °C)-98.6 °F (37 °C)] 97.9 °F (36.6 °C)  Heart Rate:  [62-81] 73  Resp:  [18-20] 18  BP: (120-148)/(58-76) 128/62     Physical Exam:    Constitutional: awake, no acute distress  HENT: NCAT, no JVD  Respiratory: Clear to auscultation bilaterally, respiratory effort normal   Cardiovascular:  s1 and s2, RRR  Gastrointestinal: Positive bowel sounds, soft, nontender, obese abdomen  Musculoskeletal: No bilateral ankle edema  Psychiatric: Appropriate affect, cooperative  Neurologic: Oriented x 2, strength symmetric in all extremities, Cranial Nerves grossly intact to confrontation, speech clear  Skin: No rashes    Results Reviewed:  LAB RESULTS:      Lab 22  0654 08/10/22  2343 08/10/22  2042 08/10/22  2041 08/10/22  2039   WBC 16.29*  --  15.58*  --   --    HEMOGLOBIN 13.3  --  13.8  --   --    HEMOGLOBIN, POC  --   --   --  13.9  --    HEMATOCRIT 38.3  --  40.3  --   --    HEMATOCRIT POC  --   --   --  41  --    PLATELETS 188  --  174  --   --    NEUTROS ABS 14.54*  --  13.08*  --   --    IMMATURE GRANS (ABS) 0.08*  --  0.07*  --   --    LYMPHS ABS 1.29  --  1.37  --   --    MONOS ABS 0.31  --  0.89  --   --    EOS ABS 0.00  --  0.06  --    --    MCV 82.0  --  82.8  --   --    CRP  --   --  1.22*  --   --    PROCALCITONIN  --   --  0.44*  --   --    LACTATE 1.7 2.3* 2.6*  --   --    LDH  --   --  154  --   --    PROTIME  --   --  13.8  --  13.3   APTT  --   --  29.7  --   --    D DIMER QUANT  --   --  0.59*  --   --          Lab 08/11/22  0654 08/10/22  2343 08/10/22  2042 08/10/22  2041   SODIUM 139  --  137  --    POTASSIUM 4.8  --  4.6  --    CHLORIDE 104  --  102  --    CO2 22.0  --  22.0  --    ANION GAP 13.0  --  13.0  --    BUN 24*  --  27*  --    CREATININE 1.38* 1.53* 1.69* 1.50*   EGFR 49.5* 43.7* 38.8* 44.8*   GLUCOSE 295*  --  331*  --    CALCIUM 9.0  --  9.2  --    HEMOGLOBIN A1C 10.60*  --   --   --          Lab 08/11/22  0654 08/10/22  2343 08/10/22  2042   TOTAL PROTEIN 7.3 6.7 7.2   ALBUMIN 4.20 3.70 3.80   GLOBULIN  --   --  3.4   ALT (SGPT) 13 12 13  13   AST (SGOT) 12 13 13  13   BILIRUBIN 0.5 0.3 0.5   BILIRUBIN DIRECT <0.2 <0.2  --    ALK PHOS 72 63 67         Lab 08/10/22  2042 08/10/22  2039   PROBNP 230.3  --    TROPONIN T <0.010  --    PROTIME 13.8 13.3   INR 1.07 1.1         Lab 08/11/22 0654   CHOLESTEROL 146   LDL CHOL 73   HDL CHOL 37*   TRIGLYCERIDES 215*         Lab 08/10/22  2042   FERRITIN 30.13         Brief Urine Lab Results  (Last result in the past 365 days)      Color   Clarity   Blood   Leuk Est   Nitrite   Protein   CREAT   Urine HCG        08/10/22 2123 Yellow   Clear   Moderate (2+)   Negative   Negative   Negative                 Microbiology Results Abnormal     None          Adult Transthoracic Echo Complete w/ Color, Spectral and Contrast if Necessary Per Protocol    Result Date: 8/11/2022  · Left ventricular ejection fraction appears to be 56 - 60%. Left ventricular systolic function is normal. · The cardiac valves are functionally within normal limits.      CT Angiogram Neck    Result Date: 8/10/2022  DATE OF EXAM: 8/10/2022 8:37 PM  PROCEDURE: CT CEREBRAL PERFUSION W WO CONTRAST-, CT ANGIOGRAM  NECK-, CT ANGIOGRAM HEAD W AI ANALYSIS OF LVO-  INDICATIONS: Neuro deficit, acute, stroke suspected  COMPARISON: Noncontrast CT head performed concurrently  TECHNIQUE: Routine transaxial cuts were obtained through the head without administration of contrast. Routine transaxial cuts were then obtained through the head following the intravenous administration of 115 mL of Isovue 370. Core blood volume, core blood flow, mean transit time, and Tmax images were obtained utilizing the Rapid software protocol. A limited CT angiogram of the head was also performed to measure the blood vessel density.  Axial IV arterial phase contrast-enhanced CT angiogram of the head and neck. Three-dimensional reconstructions were postprocessed.  AI analysis of LVO was also performed  The radiation dose reduction device was turned on for each scan per the ALARA (As Low as Reasonably Achievable) protocol.  FINDINGS: CT perfusion: CT perfusion demonstrates no evidence of core infarct or significant territorial ischemic tissue at risk.  CT ANGIOGRAM: The lung apices are grossly clear. Evaluation of the neck soft tissues demonstrates no pathologic cervical adenopathy or unexpected soft tissue neck mass. The osseous structures demonstrate no evidence of acute fracture or aggressive osseous lesion. On the right, there is no significant atherosclerotic narrowing of the ICA origin, 0% by NASCET criteria. Similar 0% narrowing is present on the left. The vertebral arteries are normal in course and caliber, minimally diminutive on the left. Intracranially, the carotid siphons demonstrate multifocal moderate calcific atherosclerotic change, with some focal moderate narrowing of the left cavernous segment. The anterior cerebral arteries are normal in course and caliber. The right middle cerebral artery demonstrates no evidence of flow-limiting stenosis, large vessel occlusion or aneurysmal dilatation. The left middle cerebral artery is also normal in  course and caliber. There is some focal narrowing of the diminutive left intradural vertebral artery. Patent basilar artery and right vertebral artery. The posterior cerebral arteries are normal in course and caliber bilaterally.      Impression: CT perfusion demonstrates no evidence of core infarct or significant territorial ischemic tissue at risk.  CT angiogram demonstrates no evidence of high-grade flow limiting stenosis, large vessel occlusion or aneurysmal dilatation. There is focal moderate narrowing of the left intracranial ICA, in addition to focal moderate to severe narrowing of the otherwise diminutive intradural left vertebral artery.  This report was finalized on 8/10/2022 9:06 PM by Jeff Aguilar.      MRI Brain Without Contrast    Result Date: 8/11/2022  Examination: MRI BRAIN WO CONTRAST-  Date of Exam: 8/11/2022 1:15 AM  Indication: Stroke, follow up; I63.9-Cerebral infarction, unspecified; R29.810-Facial weakness; R47.81-Slurred speech; D72.825-Bandemia; U07.1-COVID-19.  Comparison: Correlation with CT head and CT angiogram head and neck 8/10/2022.  Technique: Standard non-contrast MR pulse sequences of the brain were obtained.  Findings: There is no diffusion restriction to suggest acute infarct. There is no evidence of acute or chronic intracranial hemorrhage.  There is patchy periventricular and scattered patchy and punctate foci of FLAIR hyperintense signal within the cerebral white matter. There is generalized parenchymal volume loss congruent with age. There are prominent perivascular spaces present throughout the basal ganglia. No mass effect or midline shift. No abnormal extra-axial collections.  The major vascular flow voids appear intact. The basal ganglia, brainstem and cerebellum appear within normal limits.  Calvarial and superficial soft tissue signal is within normal limits.  There is evidence of prior lens replacement surgery. The paranasal sinuses and the mastoid air cells  appear well aerated.  Midline structures are intact.      Impression:  1. No acute intracranial abnormality. 2. Generalized parenchymal volume loss and moderate chronic small vessel ischemic change.  This report was finalized on 8/11/2022 8:35 AM by Hema Montilla MD.      XR Chest 1 View    Result Date: 8/10/2022  DATE OF EXAM: 8/10/2022 8:45 PM  PROCEDURE: XR CHEST 1 VW-  INDICATIONS: Acute Stroke Protocol (onset < 12 hrs)  COMPARISON: No comparisons available.  TECHNIQUE: Single radiographic AP view of the chest was obtained.  FINDINGS: Moderate chronic changes of the lung fields are present without focal airspace opacity. There is no significant pleural effusion or distinct pneumothorax. The heart appears enlarged.      Impression: Moderate chronic interstitial changes of the lung fields without evidence of acute cardiopulmonary abnormality.  This report was finalized on 8/10/2022 9:40 PM by Jeff Aguilar.      CT Head Without Contrast Stroke Protocol    Result Date: 8/10/2022  DATE OF EXAM: 8/10/2022 8:32 PM  PROCEDURE: CT HEAD WO CONTRAST STROKE PROTOCOL-  INDICATIONS: Neuro deficit, acute, stroke suspected  COMPARISON: No comparisons available.  TECHNIQUE: Routine transaxial and coronal reconstruction images were obtained through the head without the administration of contrast. Automated exposure control and iterative reconstruction methods were used.  The radiation dose reduction device was turned on for each scan per the ALARA (As Low as Reasonably Achievable) protocol.  FINDINGS: Gray-white differentiation is maintained and there is no evidence of intracranial hemorrhage, mass or mass effect. Age-related changes of the brain are present including volume loss and typical periventricular sequela of chronic small vessel ischemia. There is otherwise no evidence of intracranial hemorrhage, mass or mass effect. The ventricles are normal in size and configuration accounting for surrounding volume loss. The  orbits are normal and the paranasal sinuses are grossly clear.      Impression: Age-related changes of the brain as above, otherwise without evidence of acute intracranial abnormality.  Scan performed 8:32 PM 8/10/2022, results related to the stroke team via the CT technologist by Jeff Aguilar at 8:37 PM same day   This report was finalized on 8/10/2022 8:38 PM by Jeff Aguilar.      CT Angiogram Head w AI Analysis of LVO    Result Date: 8/10/2022  DATE OF EXAM: 8/10/2022 8:37 PM  PROCEDURE: CT CEREBRAL PERFUSION W WO CONTRAST-, CT ANGIOGRAM NECK-, CT ANGIOGRAM HEAD W AI ANALYSIS OF LVO-  INDICATIONS: Neuro deficit, acute, stroke suspected  COMPARISON: Noncontrast CT head performed concurrently  TECHNIQUE: Routine transaxial cuts were obtained through the head without administration of contrast. Routine transaxial cuts were then obtained through the head following the intravenous administration of 115 mL of Isovue 370. Core blood volume, core blood flow, mean transit time, and Tmax images were obtained utilizing the Rapid software protocol. A limited CT angiogram of the head was also performed to measure the blood vessel density.  Axial IV arterial phase contrast-enhanced CT angiogram of the head and neck. Three-dimensional reconstructions were postprocessed.  AI analysis of LVO was also performed  The radiation dose reduction device was turned on for each scan per the ALARA (As Low as Reasonably Achievable) protocol.  FINDINGS: CT perfusion: CT perfusion demonstrates no evidence of core infarct or significant territorial ischemic tissue at risk.  CT ANGIOGRAM: The lung apices are grossly clear. Evaluation of the neck soft tissues demonstrates no pathologic cervical adenopathy or unexpected soft tissue neck mass. The osseous structures demonstrate no evidence of acute fracture or aggressive osseous lesion. On the right, there is no significant atherosclerotic narrowing of the ICA origin, 0% by NASCET  criteria. Similar 0% narrowing is present on the left. The vertebral arteries are normal in course and caliber, minimally diminutive on the left. Intracranially, the carotid siphons demonstrate multifocal moderate calcific atherosclerotic change, with some focal moderate narrowing of the left cavernous segment. The anterior cerebral arteries are normal in course and caliber. The right middle cerebral artery demonstrates no evidence of flow-limiting stenosis, large vessel occlusion or aneurysmal dilatation. The left middle cerebral artery is also normal in course and caliber. There is some focal narrowing of the diminutive left intradural vertebral artery. Patent basilar artery and right vertebral artery. The posterior cerebral arteries are normal in course and caliber bilaterally.      Impression: CT perfusion demonstrates no evidence of core infarct or significant territorial ischemic tissue at risk.  CT angiogram demonstrates no evidence of high-grade flow limiting stenosis, large vessel occlusion or aneurysmal dilatation. There is focal moderate narrowing of the left intracranial ICA, in addition to focal moderate to severe narrowing of the otherwise diminutive intradural left vertebral artery.  This report was finalized on 8/10/2022 9:06 PM by Jeff Aguilar.      CT CEREBRAL PERFUSION WITH & WITHOUT CONTRAST    Result Date: 8/10/2022  DATE OF EXAM: 8/10/2022 8:37 PM  PROCEDURE: CT CEREBRAL PERFUSION W WO CONTRAST-, CT ANGIOGRAM NECK-, CT ANGIOGRAM HEAD W AI ANALYSIS OF LVO-  INDICATIONS: Neuro deficit, acute, stroke suspected  COMPARISON: Noncontrast CT head performed concurrently  TECHNIQUE: Routine transaxial cuts were obtained through the head without administration of contrast. Routine transaxial cuts were then obtained through the head following the intravenous administration of 115 mL of Isovue 370. Core blood volume, core blood flow, mean transit time, and Tmax images were obtained utilizing the Rapid  software protocol. A limited CT angiogram of the head was also performed to measure the blood vessel density.  Axial IV arterial phase contrast-enhanced CT angiogram of the head and neck. Three-dimensional reconstructions were postprocessed.  AI analysis of LVO was also performed  The radiation dose reduction device was turned on for each scan per the ALARA (As Low as Reasonably Achievable) protocol.  FINDINGS: CT perfusion: CT perfusion demonstrates no evidence of core infarct or significant territorial ischemic tissue at risk.  CT ANGIOGRAM: The lung apices are grossly clear. Evaluation of the neck soft tissues demonstrates no pathologic cervical adenopathy or unexpected soft tissue neck mass. The osseous structures demonstrate no evidence of acute fracture or aggressive osseous lesion. On the right, there is no significant atherosclerotic narrowing of the ICA origin, 0% by NASCET criteria. Similar 0% narrowing is present on the left. The vertebral arteries are normal in course and caliber, minimally diminutive on the left. Intracranially, the carotid siphons demonstrate multifocal moderate calcific atherosclerotic change, with some focal moderate narrowing of the left cavernous segment. The anterior cerebral arteries are normal in course and caliber. The right middle cerebral artery demonstrates no evidence of flow-limiting stenosis, large vessel occlusion or aneurysmal dilatation. The left middle cerebral artery is also normal in course and caliber. There is some focal narrowing of the diminutive left intradural vertebral artery. Patent basilar artery and right vertebral artery. The posterior cerebral arteries are normal in course and caliber bilaterally.      Impression: CT perfusion demonstrates no evidence of core infarct or significant territorial ischemic tissue at risk.  CT angiogram demonstrates no evidence of high-grade flow limiting stenosis, large vessel occlusion or aneurysmal dilatation. There is  focal moderate narrowing of the left intracranial ICA, in addition to focal moderate to severe narrowing of the otherwise diminutive intradural left vertebral artery.  This report was finalized on 8/10/2022 9:06 PM by Jeff Aguilar.        Results for orders placed during the hospital encounter of 08/10/22    Adult Transthoracic Echo Complete w/ Color, Spectral and Contrast if Necessary Per Protocol    Interpretation Summary  · Left ventricular ejection fraction appears to be 56 - 60%. Left ventricular systolic function is normal.  · The cardiac valves are functionally within normal limits.      I have reviewed the medications:  Scheduled Meds:aspirin, 325 mg, Oral, Daily   Or  aspirin, 300 mg, Rectal, Daily  atorvastatin, 80 mg, Oral, Nightly  dexamethasone, 6 mg, Oral, Daily   Or  dexamethasone, 6 mg, Intravenous, Daily  donepezil, 10 mg, Oral, BID  insulin lispro, 0-9 Units, Subcutaneous, TID AC  mirtazapine, 30 mg, Oral, Nightly  pantoprazole, 40 mg, Oral, Daily  QUEtiapine, 50 mg, Oral, Nightly  [START ON 8/12/2022] remdesivir, 100 mg, Intravenous, Q24H  sertraline, 150 mg, Oral, Daily  sodium chloride, 1,000 mL, Intravenous, Once  sodium chloride, 10 mL, Intravenous, Q12H  sodium chloride, 10 mL, Intravenous, Q12H      Continuous Infusions:Pharmacy Consult - Remdesivir,       PRN Meds:.•  acetaminophen  •  dextrose  •  dextrose  •  glucagon (human recombinant)  •  melatonin  •  Pharmacy Consult - Remdesivir  •  sodium chloride  •  sodium chloride  •  sodium chloride    Assessment & Plan   Assessment & Plan     Active Hospital Problems    Diagnosis  POA   • **Cerebrovascular accident (CVA), unspecified mechanism (HCC) [I63.9]  Yes   • Essential hypertension [I10]  Yes   • Hyperlipidemia [E78.5]  Yes   • Paroxysmal atrial fibrillation (HCC) [I48.0]  Yes   • Sepsis (HCC) [A41.9]  Yes   • COVID [U07.1]  Yes   • Type 2 diabetes mellitus (HCC) [E11.9]  Yes   • Coronary artery disease [I25.10]  Yes      Resolved  Hospital Problems   No resolved problems to display.        Brief Hospital Course to date:  NANCY Benton is a 87 y.o. male with history of dementia, CAD s/p stents, HTN, HLD, and T2DM who presents to [facility] ED for complaint of right facial droop and slurred speech.     Right facial droop  Dehydration  -Lactate 2.6 (present on admission)  -very high Hemoglobin A1C - months of osmotic diuresis  -Patient currently stable and in no acute distress. VSS on room air  -discussed case with Stroke team  -CT head shows age-related changes. Otherwise negative.   -CTA head/neck/perfusion shows focal moderate narrowing of the left intracranial ICA, in addition to focal moderate-severe narrowing of the intradural left vertebral artery.  -MRI no stroke  -Echo - normal EF, normal valves  -Allow permissive HTN for now, SBP <220  -Neuro checks  -Fall precautions     COVID  -Covid positive.  -He is reportedly fully vaccinated. Family member noted that patient had a mild cough, but is otherwise asymptomatic for Covid.  -Source of infection, leukocytosis, elevated lactate, elevated procal.  -Blood cultures pending  -Started remdesivir and Decadron  -Covid labs pending  -Isolation precautions  -UA not very impressive for a UTI. Negative for bacteria and nitrites. Does show some WBCs but is also very contaminated. Consider repeat via I&O cath.  -Started on Vanc + Ceftriaxone in the ED  -1L NS bolus x2 ordered in the ED  -got IV fluids overnight     CAD s/p stents  HTN  HLD  -Allow permissive HTN per stroke team, SBP <220  -Continue statin    Dementia  -Aricept 10 mg BID     Uncontrolled DM  -Blood glucose markedly elevated  -months of osmotic diuresis  -insulin SS  -Diabetes Educator     Acute Kidney Injury vs CKD  -Creatinine 1.69 today. Unclear on his baseline  -eGFR 38.8  -Received 1L Ns bolus x2 in the ED  -Gentle IV fluids overnight  -Avoid nephrotoxic agents    Zio Patch at discharge  Full dose aspirin    Expected Discharge  Location and Transportation:  Home with Home Health - needs 24/7 supervision  Expected Discharge Date:  8/16    DVT prophylaxis:  Mechanical DVT prophylaxis orders are present.     AM-PAC 6 Clicks Score (PT): 18 (08/11/22 0827)    CODE STATUS:   Code Status and Medical Interventions:   Ordered at: 08/10/22 2313     Medical Intervention Limits:    NO intubation (DNI)     Code Status (Patient has no pulse and is not breathing):    No CPR (Do Not Attempt to Resuscitate)     Medical Interventions (Patient has pulse or is breathing):    Limited Support       Kadeem Bashir MD  08/11/22

## 2022-08-11 NOTE — ED PROVIDER NOTES
[place]    EMERGENCY DEPARTMENT ENCOUNTER      Pt Name: NANCY Benton  MRN: [id number]  YOB: 1934  Date of evaluation: 8/10/2022  Provider: Emerson Cuellar DO    CHIEF COMPLAINT       Chief Complaint   Patient presents with   • Stroke         HISTORY OF PRESENT ILLNESS  (Location/Symptom, Timing/Onset, Context/Setting, Quality, Duration, Modifying Factors, Severity.)   NANCY Benton is a 87 y.o. male who presents to the emergency department for evaluation via EMS secondary to concern for possible stroke.  Per EMS the patient has been noted to be leaning to the right on EMS arrival, family was concerned the patient had a right-sided facial droop.  EMS notes the patient was sitting in his recliner chair, did have some right leaning with right facial droop on their arrival.  They noted the patient to be in A. fib possibly without any known history of A. fib or known blood thinners.  History is somewhat limited as the patient presents without family members present.  EMS noted the patient was found 2 hours ago to have the weakness, facial droop, there is a gray area on when the patient's last known well was none the phone numbers are working for the patient.  Apparently his wife or daughter during this incident had fallen and landed on her hip, they may become in the hospital for possible hip injury/fracture.  The patient himself does not provide much history, cannot tell what medications he is on daily.  He denies any headache, denies any loss of vision, denies any chest pain or palpitations.  He notes generalized weakness but he is really having difficulty answering all questions, patient appears to be a pretty poor historian during my evaluation.      Nursing notes were reviewed.    REVIEW OF SYSTEMS    (2-9 systems for level 4, 10 or more for level 5)   ROS:  Except as noted above unable to fully perform review of systems secondary to patient's ability and altered state      PAST MEDICAL HISTORY  "  No past medical history on file.      SURGICAL HISTORY     No past surgical history on file.      CURRENT MEDICATIONS       Current Facility-Administered Medications:   •  aspirin tablet 325 mg, 325 mg, Oral, Daily **OR** aspirin suppository 300 mg, 300 mg, Rectal, Daily, Jeannine Hurd APRN  •  cefTRIAXone (ROCEPHIN) 1 g/100 mL 0.9% NS (MBP), 1 g, Intravenous, Once, Emerson Cuellar DO, 1 g at 08/10/22 2213  •  sodium chloride 0.9 % bolus 1,000 mL, 1,000 mL, Intravenous, Once, Gurvinder Youngblood, , Last Rate: 1,000 mL/hr at 08/10/22 2212, 1,000 mL at 08/10/22 2212  •  sodium chloride 0.9 % bolus 1,000 mL, 1,000 mL, Intravenous, Once, Emerson Cuellar DO  •  sodium chloride 0.9 % flush 10 mL, 10 mL, Intravenous, PRN, Emerson Cuellar DO  •  vancomycin 2000 mg/500 mL 0.9% NS IVPB ([facility]), 20 mg/kg, Intravenous, Once, Emerson Cuellar DO  No current outpatient medications on file.    ALLERGIES     Patient has no known allergies.    FAMILY HISTORY     No family history on file.       SOCIAL HISTORY       Social History     Socioeconomic History   • Marital status:          PHYSICAL EXAM    (up to 7 for level 4, 8 or more for level 5)     Vitals:    08/10/22 2034 08/10/22 2039 08/10/22 2119   BP:  128/76 133/63   BP Location:   Left arm   Patient Position:   Lying   Pulse:   81   Resp:   20   Temp:   98.6 °F (37 °C)   TempSrc:   Oral   SpO2:   91%   Weight: 100 kg (221 lb 1.9 oz)     Height:   182.9 cm (72\")       Physical Exam  General : Patient is awake, alert to person, but not time, month or year.  HEENT: Pupils are equally round, EOMI, conjunctivae clear, there is no injection no icterus.  Oral mucosa is moist, uvula midline   Neck: Neck is supple  Cardiac: Heart irregularly irregular  Lungs: Lungs with decreased breath sounds bilaterally, no acute respiratory distress appreciated.  Chest wall: There is no tenderness to palpation over the chest wall or over ribs  Abdomen: Abdomen is " soft, nontender  Musculoskeletal: 4 out of 5 strength in all 4 extremities.  No obvious focal muscle deficits are appreciated  Neuro: Patient is able to answer his name and date of birth but is unsure of time or month currently, is very limited with his responses, does have generalized weakness of his bilateral upper extremities some slight decrease in his  strength on the right compared to the left.  He is able to hold his arms off the bed for greater than 5 seconds.  He is able to individually raise and hold each leg off the bed for greater than 5 seconds.  No sensory deficit.  Please refer to stroke navigator for full NIH score.  Dermatology: Skin is warm and dry        DIAGNOSTIC RESULTS     EKG: All EKGs are interpreted by the Emergency Department Physician who either signs or Co-signs this chart in the absence of a cardiologist.    ECG 12 Lead    (Results Pending)       RADIOLOGY:   Non-plain film images such as CT, Ultrasound and MRI are read by the radiologist. Plain radiographic images are visualized and preliminarily interpreted by the emergency physician with the below findings:      [] Radiologist's Report Reviewed:  XR Chest 1 View   Final Result   Moderate chronic interstitial changes of the lung fields   without evidence of acute cardiopulmonary abnormality.        This report was finalized on 8/10/2022 9:40 PM by Jeff Aguilar.          CT Angiogram Head w AI Analysis of LVO   Final Result   CT perfusion demonstrates no evidence of core infarct or significant   territorial ischemic tissue at risk.       CT angiogram demonstrates no evidence of high-grade flow limiting   stenosis, large vessel occlusion or aneurysmal dilatation. There is   focal moderate narrowing of the left intracranial ICA, in addition to   focal moderate to severe narrowing of the otherwise diminutive   intradural left vertebral artery.       This report was finalized on 8/10/2022 9:06 PM by Jeff Aguilar.          CT  CEREBRAL PERFUSION WITH & WITHOUT CONTRAST   Final Result   CT perfusion demonstrates no evidence of core infarct or significant   territorial ischemic tissue at risk.       CT angiogram demonstrates no evidence of high-grade flow limiting   stenosis, large vessel occlusion or aneurysmal dilatation. There is   focal moderate narrowing of the left intracranial ICA, in addition to   focal moderate to severe narrowing of the otherwise diminutive   intradural left vertebral artery.       This report was finalized on 8/10/2022 9:06 PM by Jeff Aguilar.          CT Angiogram Neck   Final Result   CT perfusion demonstrates no evidence of core infarct or significant   territorial ischemic tissue at risk.       CT angiogram demonstrates no evidence of high-grade flow limiting   stenosis, large vessel occlusion or aneurysmal dilatation. There is   focal moderate narrowing of the left intracranial ICA, in addition to   focal moderate to severe narrowing of the otherwise diminutive   intradural left vertebral artery.       This report was finalized on 8/10/2022 9:06 PM by Jeff Aguilar.          CT Head Without Contrast Stroke Protocol   Final Result   Age-related changes of the brain as above, otherwise without   evidence of acute intracranial abnormality.       Scan performed 8:32 PM 8/10/2022, results related to the stroke team via   the CT technologist by Jeff Aguilar at 8:37 PM same day           This report was finalized on 8/10/2022 8:38 PM by Jeff Aguilar.                ED BEDSIDE ULTRASOUND:   Performed by ED Physician - none    LABS:    I have reviewed and interpreted all of the currently available lab results from this visit (if applicable):  Results for orders placed or performed during the hospital encounter of 08/10/22   COVID-19 and FLU A/B PCR - Swab, Nasopharynx    Specimen: Nasopharynx; Swab   Result Value Ref Range    COVID19 Detected (C) Not Detected - Ref. Range    Influenza A PCR Not  Detected Not Detected    Influenza B PCR Not Detected Not Detected   Troponin    Specimen: Arm, Right; Blood   Result Value Ref Range    Troponin T <0.010 0.000 - 0.030 ng/mL   aPTT    Specimen: Arm, Right; Blood   Result Value Ref Range    PTT 29.7 22.0 - 39.0 seconds   AST    Specimen: Arm, Right; Blood   Result Value Ref Range    AST (SGOT) 13 1 - 40 U/L   ALT    Specimen: Arm, Right; Blood   Result Value Ref Range    ALT (SGPT) 13 1 - 41 U/L   CBC Auto Differential    Specimen: Arm, Right; Blood   Result Value Ref Range    WBC 15.58 (H) 3.40 - 10.80 10*3/mm3    RBC 4.87 4.14 - 5.80 10*6/mm3    Hemoglobin 13.8 13.0 - 17.7 g/dL    Hematocrit 40.3 37.5 - 51.0 %    MCV 82.8 79.0 - 97.0 fL    MCH 28.3 26.6 - 33.0 pg    MCHC 34.2 31.5 - 35.7 g/dL    RDW 14.7 12.3 - 15.4 %    RDW-SD 44.4 37.0 - 54.0 fl    MPV 9.4 6.0 - 12.0 fL    Platelets 174 140 - 450 10*3/mm3    Neutrophil % 84.0 (H) 42.7 - 76.0 %    Lymphocyte % 8.8 (L) 19.6 - 45.3 %    Monocyte % 5.7 5.0 - 12.0 %    Eosinophil % 0.4 0.3 - 6.2 %    Basophil % 0.7 0.0 - 1.5 %    Immature Grans % 0.4 0.0 - 0.5 %    Neutrophils, Absolute 13.08 (H) 1.70 - 7.00 10*3/mm3    Lymphocytes, Absolute 1.37 0.70 - 3.10 10*3/mm3    Monocytes, Absolute 0.89 0.10 - 0.90 10*3/mm3    Eosinophils, Absolute 0.06 0.00 - 0.40 10*3/mm3    Basophils, Absolute 0.11 0.00 - 0.20 10*3/mm3    Immature Grans, Absolute 0.07 (H) 0.00 - 0.05 10*3/mm3    nRBC 0.0 0.0 - 0.2 /100 WBC   Comprehensive Metabolic Panel    Specimen: Arm, Right; Blood   Result Value Ref Range    Glucose 331 (H) 65 - 99 mg/dL    BUN 27 (H) 8 - 23 mg/dL    Creatinine 1.69 (H) 0.76 - 1.27 mg/dL    Sodium 137 136 - 145 mmol/L    Potassium 4.6 3.5 - 5.2 mmol/L    Chloride 102 98 - 107 mmol/L    CO2 22.0 22.0 - 29.0 mmol/L    Calcium 9.2 8.6 - 10.5 mg/dL    Total Protein 7.2 6.0 - 8.5 g/dL    Albumin 3.80 3.50 - 5.20 g/dL    ALT (SGPT) 13 1 - 41 U/L    AST (SGOT) 13 1 - 40 U/L    Alkaline Phosphatase 67 39 - 117 U/L    Total  Bilirubin 0.5 0.0 - 1.2 mg/dL    Globulin 3.4 gm/dL    A/G Ratio 1.1 g/dL    BUN/Creatinine Ratio 16.0 7.0 - 25.0    Anion Gap 13.0 5.0 - 15.0 mmol/L    eGFR 38.8 (L) >60.0 mL/min/1.73   Urinalysis With Culture If Indicated - Urine, Catheter    Specimen: Urine, Catheter   Result Value Ref Range    Color, UA Yellow Yellow, Straw    Appearance, UA Clear Clear    pH, UA <=5.0 5.0 - 8.0    Specific Gravity, UA 1.059 (H) 1.001 - 1.030    Glucose, UA >=1000 mg/dL (3+) (A) Negative    Ketones, UA Negative Negative    Bilirubin, UA Negative Negative    Blood, UA Moderate (2+) (A) Negative    Protein, UA Negative Negative    Leuk Esterase, UA Negative Negative    Nitrite, UA Negative Negative    Urobilinogen, UA 1.0 E.U./dL 0.2 - 1.0 E.U./dL   Lactic Acid, Plasma    Specimen: Arm, Right; Blood   Result Value Ref Range    Lactate 2.6 (C) 0.5 - 2.0 mmol/L   BNP    Specimen: Arm, Right; Blood   Result Value Ref Range    proBNP 230.3 0.0 - 1,800.0 pg/mL   Urinalysis, Microscopic Only - Urine, Catheter    Specimen: Urine, Catheter   Result Value Ref Range    RBC, UA Too Numerous to Count (A) None Seen, 0-2 /HPF    WBC, UA 3-5 (A) None Seen, 0-2 /HPF    Bacteria, UA None Seen None Seen, Trace /HPF    Squamous Epithelial Cells, UA 7-12 (A) None Seen, 0-2 /HPF    Hyaline Casts, UA None Seen 0 - 6 /LPF    WBC Clumps, UA Moderate/2+ None Seen /HPF    Methodology Manual Light Microscopy    POC Protime / INR    Specimen: Blood   Result Value Ref Range    Protime 13.3 12.8 - 15.2 seconds    INR 1.1 0.8 - 1.2   POC CHEM 8    Specimen: Blood   Result Value Ref Range    Glucose 312 (H) 70 - 130 mg/dL    BUN 26 8 - 26 mg/dL    Creatinine 1.50 (H) 0.60 - 1.30 mg/dL    Sodium 137 (L) 138 - 146 mmol/L    POC Potassium 4.5 3.5 - 4.9 mmol/L    Chloride 101 98 - 109 mmol/L    Total CO2 22 (L) 24 - 29 mmol/L    Hemoglobin 13.9 12.0 - 17.0 g/dL    Hematocrit 41 38 - 51 %    Ionized Calcium 1.25 1.20 - 1.32 mmol/L    eGFR 44.8 (L) >60.0 mL/min/1.73  "  Green Top (Gel)   Result Value Ref Range    Extra Tube Hold for add-ons.    Lavender Top   Result Value Ref Range    Extra Tube hold for add-on    Gold Top - SST   Result Value Ref Range    Extra Tube Hold for add-ons.    Light Blue Top   Result Value Ref Range    Extra Tube Hold for add-ons.         All other labs were within normal range or not returned as of this dictation.      EMERGENCY DEPARTMENT COURSE and DIFFERENTIAL DIAGNOSIS/MDM:   Vitals:    Vitals:    08/10/22 2034 08/10/22 2039 08/10/22 2119   BP:  128/76 133/63   BP Location:   Left arm   Patient Position:   Lying   Pulse:   81   Resp:   20   Temp:   98.6 °F (37 °C)   TempSrc:   Oral   SpO2:   91%   Weight: 100 kg (221 lb 1.9 oz)     Height:   182.9 cm (72\")            Patient presents with difficulty with speech, right-sided facial droop, right-sided weakness when compared to left, last known well is unknown, possible notice of the symptoms was 2 hours prior to arrival.  Patient seen by myself and stroke navigator CT scan upon arrival.  After arrival the patient's daughter is present and she notes the patient had slurred speech starting around 1 PM which was his last known normal.  The CT angiography reveals some stenotic lesions along the left ICA and vertebral artery.  Patient not a TNKase candidate as he is outside of window of last known normal.  Patient is positive for COVID-19.  He will need further work-up and evaluation for acute neurological deficit, stroke team evaluation, neurology consultations, rehydration as he appears dehydrated with acute renal insufficiency.  Patient also has a leukocytosis, bandemia, urine is very foul-smelling, full of sediment, will start the patient on broad-spectrum antibiotics, can decrease as needed.      MEDICATIONS ADMINISTERED IN ED:  Medications   sodium chloride 0.9 % flush 10 mL (has no administration in time range)   cefTRIAXone (ROCEPHIN) 1 g/100 mL 0.9% NS (MBP) (1 g Intravenous New Bag 8/10/22 " 2213)   vancomycin 2000 mg/500 mL 0.9% NS IVPB ([facility]) (has no administration in time range)   aspirin tablet 325 mg (has no administration in time range)     Or   aspirin suppository 300 mg (has no administration in time range)   sodium chloride 0.9 % bolus 1,000 mL (1,000 mL Intravenous New Bag 8/10/22 2212)   sodium chloride 0.9 % bolus 1,000 mL (has no administration in time range)   iopamidol (ISOVUE-370) 76 % injection 150 mL (115 mL Intravenous Given 8/10/22 2046)       PROCEDURES:  Procedures    CRITICAL CARE TIME    Total Critical Care time was 30 minutes, excluding separately reportable procedures.  Patient with acute neurological deficit, stroke evaluation required multiple neurochecks, discussion with multiple 5 members as well as consultants. There was a high probability of clinically significant/life threatening deterioration in the patient's condition which required my urgent intervention.      FINAL IMPRESSION      1. Cerebrovascular accident (CVA), unspecified mechanism (HCC)    2. Facial droop    3. Slurred speech    4. Bandemia    5. COVID-19 virus infection          DISPOSITION/PLAN     ED Disposition     ED Disposition   Decision to Admit    Condition   --    Comment   Level of Care: Telemetry [5]   Diagnosis: Cerebrovascular accident (CVA), unspecified mechanism (HCC) [[id number]]   Admitting Physician: MIGUEL ÁNGEL BELTRAN [[id number]]   Attending Physician: MIGUEL ÁNGEL BELTRAN [[id number]]                 Comment: Please note this report has been produced using speech recognition software.      Emerson Cuellar DO  Attending Emergency Physician               Emerson Cuellar DO  08/10/22 2228

## 2022-08-11 NOTE — PLAN OF CARE
Goal Outcome Evaluation:  Plan of Care Reviewed With: patient   0345- Patient arrived to room 616 via gurney from the ER, 3 person assist to move pt from gurney to the hospital bed. Pt is alert and oriented x 3, forgetful. Denies pain. NIH 4. Remains NPO till swallow eval. IV fluids infusing at 75ml/hr, first dose of Remdesivir 200mg dose started. No current skin issues. Pt stated that he had a fall 2 months ago where he hit his head. Tele monitor shows NSR. Sp02 noted 89% on 2L, 02 increased to 4L.      Progress: no change

## 2022-08-11 NOTE — PROGRESS NOTES
Stroke Progress Note       Chief Complaint:  Confusion and right facial droop    Subjective    Subjective     Subjective:  Symptoms resolved      Review of Systems   Constitutional: No fatigue  HENT: Negative for nosebleeds and rhinorrhea.    Eyes: Negative for redness.   Respiratory: Negative for cough.    Gastrointestinal: Negative for anal bleeding.   Endocrine: Negative for polydipsia.   Genitourinary: Negative for enuresis and urgency.   Musculoskeletal: Negative for joint swelling.   Neurological: Negative for tremors.   Psychiatric/Behavioral: Negative for hallucinations.     Objective      Temp:  [97.8 °F (36.6 °C)-98.6 °F (37 °C)] 98.6 °F (37 °C)  Heart Rate:  [62-81] 62  Resp:  [18-20] 18  BP: (120-148)/(58-76) 136/70          NEURO    MENTAL STATUS: AAOx3, memory intact, fund of knowledge appropriate    LANG/SPEECH: Naming and repetition intact, fluent, follows 3-step commands    CRANIAL NERVES:    Pupils equal and reactive, EOMI intact, no gaze deviation, no nystagmus  No facial droop, cough and gag intact, shoulder shrug intact, tongue midline     MOTOR:  Moves all extremities equally    SENSORY: Normal to light touch all throughout     COORDINATION: Normal finger to nose and heel to shin, no tremor, no dysmetria    STATION: Not assessed due to patient condition    GAIT: Not assessed due to patient condition  Results Review:    I reviewed the patient's new clinical results.    Lab Results (last 24 hours)     Procedure Component Value Units Date/Time    POC Glucose Once [[id number]]  (Abnormal) Collected: 08/11/22 1116    Specimen: Blood Updated: 08/11/22 1117     Glucose 282 mg/dL      Comment: Meter: [id number] : [id number] [facility]       Hepatic Function Panel [[id number]] Collected: 08/11/22 0654    Specimen: Blood Updated: 08/11/22 0829     Total Protein 7.3 g/dL      Albumin 4.20 g/dL      ALT (SGPT) 13 U/L      AST (SGOT) 12 U/L      Alkaline Phosphatase 72 U/L      Total Bilirubin 0.5  mg/dL      Bilirubin, Direct <0.2 mg/dL      Bilirubin, Indirect --     Comment: Unable to calculate       STAT Lactic Acid, Reflex [[id number]]  (Normal) Collected: 08/11/22 0654    Specimen: Blood Updated: 08/11/22 0819     Lactate 1.7 mmol/L      Comment: Falsely depressed results may occur on samples drawn from patients receiving N-Acetylcysteine (NAC) or Metamizole.       Basic Metabolic Panel [[id number]]  (Abnormal) Collected: 08/11/22 0654    Specimen: Blood Updated: 08/11/22 0816     Glucose 295 mg/dL      BUN 24 mg/dL      Creatinine 1.38 mg/dL      Sodium 139 mmol/L      Potassium 4.8 mmol/L      Chloride 104 mmol/L      CO2 22.0 mmol/L      Calcium 9.0 mg/dL      BUN/Creatinine Ratio 17.4     Anion Gap 13.0 mmol/L      eGFR 49.5 mL/min/1.73      Comment: National Kidney Foundation and American Society of Nephrology (ASN) Task Force recommended calculation based on the Chronic Kidney Disease Epidemiology Collaboration (CKD-EPI) equation refit without adjustment for race.       Narrative:      GFR Normal >60  Chronic Kidney Disease <60  Kidney Failure <15      Lipid Panel [[id number]]  (Abnormal) Collected: 08/11/22 0654    Specimen: Blood Updated: 08/11/22 0816     Total Cholesterol 146 mg/dL      Triglycerides 215 mg/dL      HDL Cholesterol 37 mg/dL      LDL Cholesterol  73 mg/dL      VLDL Cholesterol 36 mg/dL      LDL/HDL Ratio 1.78    Narrative:      Cholesterol Reference Ranges  (U.S. Department of Health and Human Services ATP III Classifications)    Desirable          <200 mg/dL  Borderline High    200-239 mg/dL  High Risk          >240 mg/dL      Triglyceride Reference Ranges  (U.S. Department of Health and Human Services ATP III Classifications)    Normal           <150 mg/dL  Borderline High  150-199 mg/dL  High             200-499 mg/dL  Very High        >500 mg/dL    HDL Reference Ranges  (U.S. Department of Health and Human Services ATP III Classifications)    Low     <40 mg/dl (major risk factor  for CHD)  High    >60 mg/dl ('negative' risk factor for CHD)        LDL Reference Ranges  (U.S. Department of Health and Human Services ATP III Classifications)    Optimal          <100 mg/dL  Near Optimal     100-129 mg/dL  Borderline High  130-159 mg/dL  High             160-189 mg/dL  Very High        >189 mg/dL    Hemoglobin A1c [[id number]]  (Abnormal) Collected: 08/11/22 0654    Specimen: Blood Updated: 08/11/22 0806     Hemoglobin A1C 10.60 %     Narrative:      Hemoglobin A1C Ranges:    Increased Risk for Diabetes  5.7% to 6.4%  Diabetes                     >= 6.5%  Diabetic Goal                < 7.0%    CBC & Differential [[id number]]  (Abnormal) Collected: 08/11/22 0654    Specimen: Blood Updated: 08/11/22 0756    Narrative:      The following orders were created for panel order CBC & Differential.  Procedure                               Abnormality         Status                     ---------                               -----------         ------                     CBC Auto Differential[[id number]]        Abnormal            Final result                 Please view results for these tests on the individual orders.    CBC Auto Differential [[id number]]  (Abnormal) Collected: 08/11/22 0654    Specimen: Blood Updated: 08/11/22 0756     WBC 16.29 10*3/mm3      RBC 4.67 10*6/mm3      Hemoglobin 13.3 g/dL      Hematocrit 38.3 %      MCV 82.0 fL      MCH 28.5 pg      MCHC 34.7 g/dL      RDW 14.7 %      RDW-SD 43.9 fl      MPV 9.7 fL      Platelets 188 10*3/mm3      Neutrophil % 89.3 %      Lymphocyte % 7.9 %      Monocyte % 1.9 %      Eosinophil % 0.0 %      Basophil % 0.4 %      Immature Grans % 0.5 %      Neutrophils, Absolute 14.54 10*3/mm3      Lymphocytes, Absolute 1.29 10*3/mm3      Monocytes, Absolute 0.31 10*3/mm3      Eosinophils, Absolute 0.00 10*3/mm3      Basophils, Absolute 0.07 10*3/mm3      Immature Grans, Absolute 0.08 10*3/mm3      nRBC 0.0 /100 WBC     POC Glucose Once [[id number]]  (Abnormal)  Collected: 08/11/22 0701    Specimen: Blood Updated: 08/11/22 0703     Glucose 343 mg/dL      Comment: Meter: [id number] : [id number] Carola [facility]connor       Hepatic Function Panel [[id number]] Collected: 08/10/22 2343    Specimen: Blood Updated: 08/11/22 0108     Total Protein 6.7 g/dL      Albumin 3.70 g/dL      ALT (SGPT) 12 U/L      AST (SGOT) 13 U/L      Alkaline Phosphatase 63 U/L      Total Bilirubin 0.3 mg/dL      Bilirubin, Direct <0.2 mg/dL      Comment: Specimen hemolyzed. Results may be affected.        Bilirubin, Indirect --     Comment: Unable to calculate       STAT Lactic Acid, Reflex [[id number]]  (Abnormal) Collected: 08/10/22 2343    Specimen: Blood Updated: 08/11/22 0103     Lactate 2.3 mmol/L      Comment: Falsely depressed results may occur on samples drawn from patients receiving N-Acetylcysteine (NAC) or Metamizole.       [place] Draw [[id number]] Collected: 08/10/22 2042    Specimen: Blood from Arm, Right Updated: 08/11/22 0047    Narrative:      The following orders were created for panel order [place] Draw.  Procedure                               Abnormality         Status                     ---------                               -----------         ------                     Green Top (Gel)[[id number]]                                  Final result               Lavender Top[[id number]]                                     Final result               Gold Top - SST[[id number]]                                   Final result               Gray Top[[id number]]                                         Final result               Light Blue Top[[id number]]                                   Final result                 Please view results for these tests on the individual orders.    Gray Top [[id number]] Collected: 08/10/22 2042    Specimen: Blood from Arm, Right Updated: 08/11/22 0047     Extra Tube Hold for add-ons.     Comment: Auto resulted.       Creatinine, Serum [[id number]]  (Abnormal)  Collected: 08/10/22 2343    Specimen: Blood Updated: 08/11/22 0020     Creatinine 1.53 mg/dL      eGFR 43.7 mL/min/1.73      Comment: National Kidney Foundation and American Society of Nephrology (ASN) Task Force recommended calculation based on the Chronic Kidney Disease Epidemiology Collaboration (CKD-EPI) equation refit without adjustment for race.       Narrative:      GFR Normal >60  Chronic Kidney Disease <60  Kidney Failure <15      Ferritin [[id number]]  (Normal) Collected: 08/10/22 2042    Specimen: Blood from Arm, Right Updated: 08/11/22 0001     Ferritin 30.13 ng/mL     Narrative:      Results may be falsely decreased if patient taking Biotin.      Lactate Dehydrogenase [[id number]]  (Normal) Collected: 08/10/22 2042    Specimen: Blood from Arm, Right Updated: 08/10/22 2358      U/L     C-reactive Protein [[id number]]  (Abnormal) Collected: 08/10/22 2042    Specimen: Blood from Arm, Right Updated: 08/10/22 2356     C-Reactive Protein 1.22 mg/dL     D-dimer, Quantitative [[id number]]  (Abnormal) Collected: 08/10/22 2042    Specimen: Blood from Arm, Right Updated: 08/10/22 2335     D-Dimer, Quantitative 0.59 MCGFEU/mL     Narrative:      The D-Dimer assay test is to be used in conjunction with a clinical pretest probability (PTP) assessment model, and has been approved by the FDA to exclude pulmonary embolism (PE) and deep venous thrombosis (DVT) in outpatients suspected of PE or DVT, with a cutoff of 0.5 MCGFEU/mL.    Protime-INR [[id number]]  (Normal) Collected: 08/10/22 2042    Specimen: Blood from Arm, Right Updated: 08/10/22 2332     Protime 13.8 Seconds      INR 1.07    Procalcitonin [[id number]]  (Abnormal) Collected: 08/10/22 2042    Specimen: Blood from Arm, Right Updated: 08/10/22 2230     Procalcitonin 0.44 ng/mL     Narrative:      As a Marker for Sepsis (Non-Neonates):    1. <0.5 ng/mL represents a low risk of severe sepsis and/or septic shock.  2. >2 ng/mL represents a high risk of severe  "sepsis and/or septic shock.    As a Marker for Lower Respiratory Tract Infections that require antibiotic therapy:    PCT on Admission    Antibiotic Therapy       6-12 Hrs later    >0.5                Strongly Recommended  >0.25 - <0.5        Recommended   0.1 - 0.25          Discouraged              Remeasure/reassess PCT  <0.1                Strongly Discouraged     Remeasure/reassess PCT    As 28 day mortality risk marker: \"Change in Procalcitonin Result\" (>80% or <=80%) if Day 0 (or Day 1) and Day 4 values are available. Refer to http://[email or web address]-pct-calculator.com    Change in PCT <=80%  A decrease of PCT levels below or equal to 80% defines a positive change in PCT test result representing a higher risk for 28-day all-cause mortality of patients diagnosed with severe sepsis for septic shock.    Change in PCT >80%  A decrease of PCT levels of more than 80% defines a negative change in PCT result representing a lower risk for 28-day all-cause mortality of patients diagnosed with severe sepsis or septic shock.       COVID PRE-OP / PRE-PROCEDURE SCREENING ORDER (NO ISOLATION) - Swab, Nasopharynx [[id number]]  (Abnormal) Collected: 08/10/22 2122    Specimen: Swab from Nasopharynx Updated: 08/10/22 2214    Narrative:      The following orders were created for panel order COVID PRE-OP / PRE-PROCEDURE SCREENING ORDER (NO ISOLATION) - Swab, Nasopharynx.  Procedure                               Abnormality         Status                     ---------                               -----------         ------                     COVID-19 and FLU A/B PCR...[[id number]]  Abnormal            Final result                 Please view results for these tests on the individual orders.    COVID-19 and FLU A/B PCR - Swab, Nasopharynx [[id number]]  (Abnormal) Collected: 08/10/22 2122    Specimen: Swab from Nasopharynx Updated: 08/10/22 2214     COVID19 Detected     Influenza A PCR Not Detected     Influenza B PCR Not Detected    " Narrative:      Fact sheet for providers: https://www.fda.gov/media/722615/download    Fact sheet for patients: https://www.fda.gov/media/064216/download    Test performed by PCR.  Influenza A and Influenza B negative results should be considered presumptive in samples that have a positive SARS-CoV-2 result.    Competitive inhibition studies showed that SARS-CoV-2 virus, when present at concentrations above 3.6E+04 copies/mL, can inhibit the detection and amplification of influenza A and influenza B virus RNA if present at or below 1.8E+02 copies/mL or 4.9E+02 copies/mL, respectively, and may lead to false negative influenza virus results. If co-infection with influenza A or influenza B virus is suspected in samples with a positive SARS-CoV-2 result, the sample should be re-tested with another FDA cleared, approved, or authorized influenza test, if influenza virus detection would change clinical management.    Blood Culture - Blood, Arm, Left [[id number]] Collected: 08/10/22 2145    Specimen: Blood from Arm, Left Updated: 08/10/22 2202    Blood Culture - Blood, Arm, Right [[id number]] Collected: 08/10/22 2150    Specimen: Blood from Arm, Right Updated: 08/10/22 2201    Urinalysis, Microscopic Only - Urine, Catheter [[id number]]  (Abnormal) Collected: 08/10/22 2123    Specimen: Urine, Catheter Updated: 08/10/22 2200     RBC, UA Too Numerous to Count /HPF      WBC, UA 3-5 /HPF      Comment: Urine culture not indicated.        Bacteria, UA None Seen /HPF      Squamous Epithelial Cells, UA 7-12 /HPF      Hyaline Casts, UA None Seen /LPF      WBC Clumps, UA Moderate/2+ /HPF      Methodology Manual Light Microscopy    Lactic Acid, Plasma [[id number]]  (Abnormal) Collected: 08/10/22 2042    Specimen: Blood from Arm, Right Updated: 08/10/22 2159     Lactate 2.6 mmol/L      Comment: Falsely depressed results may occur on samples drawn from patients receiving N-Acetylcysteine (NAC) or Metamizole.       BNP [[id number]]  (Normal)  Collected: 08/10/22 2042    Specimen: Blood from Arm, Right Updated: 08/10/22 2154     proBNP 230.3 pg/mL     Narrative:      Among patients with dyspnea, NT-proBNP is highly sensitive for the detection of acute congestive heart failure. In addition NT-proBNP of <300 pg/ml effectively rules out acute congestive heart failure with 99% negative predictive value.    Results may be falsely decreased if patient taking Biotin.      Lavender Top [[id number]] Collected: 08/10/22 2042    Specimen: Blood from Arm, Right Updated: 08/10/22 2147     Extra Tube hold for add-on     Comment: Auto resulted       Light Blue Top [[id number]] Collected: 08/10/22 2042    Specimen: Blood from Arm, Right Updated: 08/10/22 2147     Extra Tube Hold for add-ons.     Comment: Auto resulted       Green Top (Gel) [[id number]] Collected: 08/10/22 2042    Specimen: Blood from Arm, Right Updated: 08/10/22 2147     Extra Tube Hold for add-ons.     Comment: Auto resulted.       Gold Top - SST [[id number]] Collected: 08/10/22 2042    Specimen: Blood from Arm, Right Updated: 08/10/22 2147     Extra Tube Hold for add-ons.     Comment: Auto resulted.       Urinalysis With Culture If Indicated - Urine, Catheter [[id number]]  (Abnormal) Collected: 08/10/22 2123    Specimen: Urine, Catheter Updated: 08/10/22 2142     Color, UA Yellow     Appearance, UA Clear     pH, UA <=5.0     Specific Gravity, UA 1.059     Glucose, UA >=1000 mg/dL (3+)     Ketones, UA Negative     Bilirubin, UA Negative     Blood, UA Moderate (2+)     Protein, UA Negative     Leuk Esterase, UA Negative     Nitrite, UA Negative     Urobilinogen, UA 1.0 E.U./dL    Narrative:      In absence of clinical symptoms, the presence of pyuria, bacteria, and/or nitrites on the urinalysis result does not correlate with infection.    aPTT [[id number]]  (Normal) Collected: 08/10/22 2042    Specimen: Blood from Arm, Right Updated: 08/10/22 2125     PTT 29.7 seconds     Narrative:      PTT = The equivalent  PTT values for the therapeutic range of heparin levels at 0.3 to 0.5 U/ml are 60 to 70 seconds.    Comprehensive Metabolic Panel [[id number]]  (Abnormal) Collected: 08/10/22 2042    Specimen: Blood from Arm, Right Updated: 08/10/22 2120     Glucose 331 mg/dL      BUN 27 mg/dL      Creatinine 1.69 mg/dL      Sodium 137 mmol/L      Potassium 4.6 mmol/L      Chloride 102 mmol/L      CO2 22.0 mmol/L      Calcium 9.2 mg/dL      Total Protein 7.2 g/dL      Albumin 3.80 g/dL      ALT (SGPT) 13 U/L      AST (SGOT) 13 U/L      Alkaline Phosphatase 67 U/L      Total Bilirubin 0.5 mg/dL      Globulin 3.4 gm/dL      Comment: Calculated Result        A/G Ratio 1.1 g/dL      BUN/Creatinine Ratio 16.0     Anion Gap 13.0 mmol/L      eGFR 38.8 mL/min/1.73      Comment: National Kidney Foundation and American Society of Nephrology (ASN) Task Force recommended calculation based on the Chronic Kidney Disease Epidemiology Collaboration (CKD-EPI) equation refit without adjustment for race.       Narrative:      GFR Normal >60  Chronic Kidney Disease <60  Kidney Failure <15      Troponin [[id number]]  (Normal) Collected: 08/10/22 2042    Specimen: Blood from Arm, Right Updated: 08/10/22 2120     Troponin T <0.010 ng/mL     Narrative:      Troponin T Reference Range:  <= 0.03 ng/mL-   Negative for AMI  >0.03 ng/mL-     Abnormal for myocardial necrosis.  Clinicians would have to utilize clinical acumen, EKG, Troponin and serial changes to determine if it is an Acute Myocardial Infarction or myocardial injury due to an underlying chronic condition.       Results may be falsely decreased if patient taking Biotin.      AST [[id number]]  (Normal) Collected: 08/10/22 2042    Specimen: Blood from Arm, Right Updated: 08/10/22 2120     AST (SGOT) 13 U/L     ALT [[id number]]  (Normal) Collected: 08/10/22 2042    Specimen: Blood from Arm, Right Updated: 08/10/22 2120     ALT (SGPT) 13 U/L     CBC & Differential [[id number]]  (Abnormal) Collected: 08/10/22  2042    Specimen: Blood from Arm, Right Updated: 08/10/22 2050    Narrative:      The following orders were created for panel order CBC & Differential.  Procedure                               Abnormality         Status                     ---------                               -----------         ------                     CBC Auto Differential[[id number]]        Abnormal            Final result                 Please view results for these tests on the individual orders.    CBC Auto Differential [[id number]]  (Abnormal) Collected: 08/10/22 2042    Specimen: Blood from Arm, Right Updated: 08/10/22 2050     WBC 15.58 10*3/mm3      RBC 4.87 10*6/mm3      Hemoglobin 13.8 g/dL      Hematocrit 40.3 %      MCV 82.8 fL      MCH 28.3 pg      MCHC 34.2 g/dL      RDW 14.7 %      RDW-SD 44.4 fl      MPV 9.4 fL      Platelets 174 10*3/mm3      Neutrophil % 84.0 %      Lymphocyte % 8.8 %      Monocyte % 5.7 %      Eosinophil % 0.4 %      Basophil % 0.7 %      Immature Grans % 0.4 %      Neutrophils, Absolute 13.08 10*3/mm3      Lymphocytes, Absolute 1.37 10*3/mm3      Monocytes, Absolute 0.89 10*3/mm3      Eosinophils, Absolute 0.06 10*3/mm3      Basophils, Absolute 0.11 10*3/mm3      Immature Grans, Absolute 0.07 10*3/mm3      nRBC 0.0 /100 WBC     POC CHEM 8 [[id number]]  (Abnormal) Collected: 08/10/22 2041    Specimen: Blood Updated: 08/10/22 2045     Glucose 312 mg/dL      BUN 26 mg/dL      Creatinine 1.50 mg/dL      Sodium 137 mmol/L      POC Potassium 4.5 mmol/L      Chloride 101 mmol/L      Total CO2 22 mmol/L      Hemoglobin 13.9 g/dL      Comment: Serial Number: [id number]  [id number]        Hematocrit 41 %      Ionized Calcium 1.25 mmol/L      eGFR 44.8 mL/min/1.73     POC Protime / INR [[id number]]  (Normal) Collected: 08/10/22 2039    Specimen: Blood Updated: 08/10/22 2043     Protime 13.3 seconds      INR 1.1     Comment: Serial Number: [id number]:  [id number]           CT Angiogram Neck    Result Date: 8/10/2022  CT  perfusion demonstrates no evidence of core infarct or significant territorial ischemic tissue at risk.  CT angiogram demonstrates no evidence of high-grade flow limiting stenosis, large vessel occlusion or aneurysmal dilatation. There is focal moderate narrowing of the left intracranial ICA, in addition to focal moderate to severe narrowing of the otherwise diminutive intradural left vertebral artery.  This report was finalized on 8/10/2022 9:06 PM by Jeff Aguilar.      MRI Brain Without Contrast    Result Date: 8/11/2022   1. No acute intracranial abnormality. 2. Generalized parenchymal volume loss and moderate chronic small vessel ischemic change.  This report was finalized on 8/11/2022 8:35 AM by Hema Montilla MD.      XR Chest 1 View    Result Date: 8/10/2022  Moderate chronic interstitial changes of the lung fields without evidence of acute cardiopulmonary abnormality.  This report was finalized on 8/10/2022 9:40 PM by Jeff Aguilar.      CT Head Without Contrast Stroke Protocol    Result Date: 8/10/2022  Age-related changes of the brain as above, otherwise without evidence of acute intracranial abnormality.  Scan performed 8:32 PM 8/10/2022, results related to the stroke team via the CT technologist by Jeff Aguilar at 8:37 PM same day   This report was finalized on 8/10/2022 8:38 PM by Jeff Aguilar.      CT Angiogram Head w AI Analysis of LVO    Result Date: 8/10/2022  CT perfusion demonstrates no evidence of core infarct or significant territorial ischemic tissue at risk.  CT angiogram demonstrates no evidence of high-grade flow limiting stenosis, large vessel occlusion or aneurysmal dilatation. There is focal moderate narrowing of the left intracranial ICA, in addition to focal moderate to severe narrowing of the otherwise diminutive intradural left vertebral artery.  This report was finalized on 8/10/2022 9:06 PM by Jeff Aguilar.      CT CEREBRAL PERFUSION WITH & WITHOUT  CONTRAST    Result Date: 8/10/2022  CT perfusion demonstrates no evidence of core infarct or significant territorial ischemic tissue at risk.  CT angiogram demonstrates no evidence of high-grade flow limiting stenosis, large vessel occlusion or aneurysmal dilatation. There is focal moderate narrowing of the left intracranial ICA, in addition to focal moderate to severe narrowing of the otherwise diminutive intradural left vertebral artery.  This report was finalized on 8/10/2022 9:06 PM by Jeff Aguilar.                Assessment/Plan     Assessment/Plan:  87 male with dementia, hypertension, and diabetes who suffered a fall and was noted to be confused with right facial droop.     On my exam today, patient motor strength intact, with no facial droop. He has no recollection of the events happened yesterday.     CTA showed- moderate narrowing of the left intracranial ICA  MRI brain- showed no acute infarct.     It is unclear if the patient had acute vascular syndrome. Patient is severely dehydrated with elevated lactic acid. Likely this toxic metabolic encephalopathy    Plan  -full dose of aspirin  -defer AC although CHADS Vasc is high( new onset of transient afib)- due to multiple falls( fall almost every day, and the daughter decline full AC for her father.   -Zio patch at discharge     No further work up from stroke stand point.         Anthony Ribeiro MD  08/11/22  13:02 EDT

## 2022-08-11 NOTE — THERAPY EVALUATION
Patient Name: NANCY Benton  : 1934    MRN: [id number]                              Today's Date: 2022       Admit Date: 8/10/2022    Visit Dx:     ICD-10-CM ICD-9-CM   1. Cerebrovascular accident (CVA), unspecified mechanism (HCC)  I63.9 434.91   2. Facial droop  R29.810 781.94   3. Slurred speech  R47.81 784.59   4. Bandemia  D72.825 288.66   5. COVID-19 virus infection  U07.1 079.89   6. Cognitive communication deficit  R41.841 799.52     Patient Active Problem List   Diagnosis   • Cerebrovascular accident (CVA), unspecified mechanism (HCC)   • Essential hypertension   • Hyperlipidemia   • Paroxysmal atrial fibrillation (HCC)   • Sepsis (HCC)   • COVID   • Type 2 diabetes mellitus (HCC)   • Coronary artery disease     Past Medical History:   Diagnosis Date   • Arthritis    • CAD (coronary artery disease)    • Dementia (HCC)    • Diabetes mellitus (HCC)    • Hypertension    • Lumbar degenerative disc disease      History reviewed. No pertinent surgical history.   General Information     Row Name 22 0937          OT Time and Intention    Document Type evaluation  -HADLEY     Mode of Treatment individual therapy;occupational therapy  -HADLEY     Row Name 22          General Information    Patient Profile Reviewed yes  -HADLEY     Prior Level of Function independent:;all household mobility;gait;transfer;bed mobility;feeding;grooming;dressing;bathing;dependent:;home management;cooking;cleaning;driving;shopping  -HADLEY     Existing Precautions/Restrictions fall;oxygen therapy device and L/min  dementia  -HADLEY     Barriers to Rehab previous functional deficit;cognitive status  -HADLEY     Row Name 2237          Occupational Profile    Environmental Supports and Barriers (Occupational Profile) pt with cane and rx wx, home bathroom setup needs TBA further  -HADLEY     Row Name 22          Living Environment    People in Home child(meghan), adult  per pt. his  helps him some, but may not live there,  per pt. young neighbor stays with him also.  -HADLEY     Row Name 08/11/22 0937          Stairs Within Home, Primary    Stairs, Within Home, Primary pt. unable to state  -HADLEY     Row Name 08/11/22 0937          Cognition    Orientation Status (Cognition) oriented to;person;disoriented to;place;situation;time;verbal cues/prompts needed for orientation  pt. knew type of place, but stated at [facility]  -HADLEY     Row Name 08/11/22 0937          Safety Issues, Functional Mobility    Safety Issues Affecting Function (Mobility) insight into deficits/self-awareness;safety precaution awareness;safety precautions follow-through/compliance;sequencing abilities;awareness of need for assistance  -     Impairments Affecting Function (Mobility) balance;cognition;endurance/activity tolerance;strength;postural/trunk control;motor planning  -           User Key  (r) = Recorded By, (t) = Taken By, (c) = Cosigned By    Initials Name Provider Type    Clarice Roque, OT Occupational Therapist                 Mobility/ADL's     Row Name 08/11/22 0940          Bed Mobility    Bed Mobility supine-sit  -     Supine-Sit [place] (Bed Mobility) minimum assist (75% patient effort)  -     Bed Mobility, Safety Issues impaired trunk control for bed mobility  -     Assistive Device (Bed Mobility) bed rails;head of bed elevated  -     Comment, (Bed Mobility) cues to use walker, min A for trink  -HADLEY     Row Name 08/11/22 0940          Transfers    Transfers sit-stand transfer;stand-sit transfer  -     Comment, (Transfers) cues for hand placement and safe wx use  -     Sit-Stand [place] (Transfers) contact guard;verbal cues  -     Stand-Sit [place] (Transfers) contact guard;verbal cues  -     Row Name 08/11/22 0940          Sit-Stand Transfer    Assistive Device (Sit-Stand Transfers) walker, front-wheeled  -HADLEY     Row Name 08/11/22 0940          Stand-Sit Transfer    Assistive Device (Stand-Sit Transfers) walker,  front-wheeled  -HADLEY     Row Name 08/11/22 0940          Functional Mobility    Functional Mobility- Ind. Level contact guard assist  -HADLEY     Functional Mobility- Device walker, front-wheeled  -HADLEY     Functional Mobility-Distance (Feet) 24  -HADLEY     Functional Mobility- Safety Issues supplemental O2  -HADLEY     Functional Mobility- Comment cues to stay close to walker several times, cue to pt. letting walker continually get way ahead of him with UE's outstretched  -     Row Name 08/11/22 0940          Activities of Daily Living    BADL Assessment/Intervention lower body dressing;upper body dressing;grooming  -     Row Name 08/11/22 0940          Lower Body Dressing Assessment/Training    [place] Level (Lower Body Dressing) doff;don;socks;standby assist  -HADLEY     Position (Lower Body Dressing) edge of bed sitting  -     Row Name 08/11/22 0940          Upper Body Dressing Assessment/Training    [place] Level (Upper Body Dressing) don;pajama/robe;maximum assist (25% patient effort)  -     Comment, (Upper Body Dressing) gown snapped around lines  -     Row Name 08/11/22 0940          Grooming Assessment/Training    [place] Level (Grooming) wash face, hands;set up;standby assist  -HADLEY     Position (Grooming) sink side;unsupported standing  -           User Key  (r) = Recorded By, (t) = Taken By, (c) = Cosigned By    Initials Name Provider Type    Clarice Roque, OT Occupational Therapist               Obj/Interventions     Row Name 08/11/22 0943          Sensory Assessment (Somatosensory)    Sensory Assessment (Somatosensory) UE sensation intact  -     Row Name 08/11/22 0943          Vision Assessment/Intervention    Vision Assessment Comment pt. able to navigate in room, locate items on sink and read lighted board in room  -     Row Name 08/11/22 0943          Range of Motion Comprehensive    General Range of Motion bilateral upper extremity ROM WFL  -     Row Name 08/11/22 0943           Strength Comprehensive (MMT)    General Manual Muscle Testing (MMT) Assessment hand strength deficits identified  -HADLEY     Comment, General Manual Muscle Testing (MMT) Assessment BUE grossly 4+ to 5/5  -HADLEY     Row Name 08/11/22 0943          Motor Skills    Motor Skills functional endurance  -     Functional Endurance good-  -     Row Name 08/11/22 0943          Balance    Balance Assessment sitting static balance;sitting dynamic balance;standing static balance;standing dynamic balance  -HADLEY     Static Sitting Balance supervision  -HADLEY     Dynamic Sitting Balance contact guard  -HADLEY     Position, Sitting Balance unsupported;sitting edge of bed  -HADLEY     Static Standing Balance contact guard  -HADLEY     Dynamic Standing Balance contact guard  -HADLEY     Position/Device Used, Standing Balance supported;walker, rolling  -HADLEY     Balance Interventions sit to stand;occupation based/functional task  -HADLEY     Comment, Balance AT EOB pt. needing CGA and cues due to tending to fall to rear  -HADLEY           User Key  (r) = Recorded By, (t) = Taken By, (c) = Cosigned By    Initials Name Provider Type    Clarice Roque, OT Occupational Therapist               Goals/Plan     Row Name 08/11/22 0949          Bed Mobility Goal 1 (OT)    Activity/Assistive Device (Bed Mobility Goal 1, OT) bed mobility activities, all  -HADLEY     [place] Level/Cues Needed (Bed Mobility Goal 1, OT) modified independence  -HADLEY     Time Frame (Bed Mobility Goal 1, OT) long term goal (LTG);10 days  -HADLEY     Progress/Outcomes (Bed Mobility Goal 1, OT) new goal  -     Row Name 08/11/22 0949          Transfer Goal 1 (OT)    Activity/Assistive Device (Transfer Goal 1, OT) commode;commode, bedside without drop arms;walker, rolling  grab bar  -HADLEY     [place] Level/Cues Needed (Transfer Goal 1, OT) standby assist;verbal cues required  -HADLEY     Time Frame (Transfer Goal 1, OT) long term goal (LTG);10 days  -HADLEY     Progress/Outcome (Transfer Goal 1, OT) new goal   -HADLEY     Row Name 08/11/22 0949          Bathing Goal 1 (OT)    Activity/Device (Bathing Goal 1, OT) bathing skills, all;shower chair  -HADLEY     [place] Level/Cues Needed (Bathing Goal 1, OT) minimum assist (75% or more patient effort);set-up required;tactile cues required;verbal cues required  -HADLEY     Time Frame (Bathing Goal 1, OT) long term goal (LTG);10 days  -HADLEY     Progress/Outcomes (Bathing Goal 1, OT) new goal  -HADLEY     Row Name 08/11/22 0949          Dressing Goal 1 (OT)    Activity/Device (Dressing Goal 1, OT) lower body dressing  undergarment and/or pants  -HADLEY     [place]/Cues Needed (Dressing Goal 1, OT) standby assist;verbal cues required  -HADLEY     Time Frame (Dressing Goal 1, OT) long term goal (LTG);10 days  -HADLEY     Progress/Outcome (Dressing Goal 1, OT) new goal  -HADLEY     Row Name 08/11/22 0949          Toileting Goal 1 (OT)    Activity/Device (Toileting Goal 1, OT) toileting skills, all;commode;grab bar/safety frame;commode, bedside without drop arms  -HADLEY     [place] Level/Cues Needed (Toileting Goal 1, OT) standby assist;verbal cues required  -HADLEY     Time Frame (Toileting Goal 1, OT) long term goal (LTG);10 days  -HADLEY     Progress/Outcome (Toileting Goal 1, OT) new goal  -HADLEY     Row Name 08/11/22 0949          Therapy Assessment/Plan (OT)    Planned Therapy Interventions (OT) activity tolerance training;BADL retraining;cognitive/visual perception retraining;functional balance retraining;occupation/activity based interventions;patient/caregiver education/training;ROM/therapeutic exercise;strengthening exercise;transfer/mobility retraining  -HADLEY           User Key  (r) = Recorded By, (t) = Taken By, (c) = Cosigned By    Initials Name Provider Type    Clarice Roque, OT Occupational Therapist               Clinical Impression     Row Name 08/11/22 0944          Pain Assessment    Pretreatment Pain Rating 0/10 - no pain  -HADLEY     Posttreatment Pain Rating 0/10 - no pain  -HADLEY     Row Name  08/11/22 0944          Plan of Care Review    Plan of Care Reviewed With patient  -HADLEY     Progress no change  -HADLEY     Outcome Evaluation OT evaluation completed with pt. demonstrating decreased activity tolerance, cognition and balance impacting BADL independence warranting continued skilled OT services.  Pt. needed min A with bed mobility and CGA to stand and mvmt in room, but needed cues for safety and task initiation at times.  Pt. able to doff and nikko socks and groom sinkside with SBA.  Expect pt. will be able to discharge home with 24/7 supervision. May need a  OT home safety assessment if not done priorly.  -HADLEY     Row Name 08/11/22 0944          Therapy Assessment/Plan (OT)    Patient/Family Therapy Goal Statement (OT) return home at prior independence level  -HADLEY     Rehab Potential (OT) good, to achieve stated therapy goals  -HADLEY     Criteria for Skilled Therapeutic Interventions Met (OT) yes;meets criteria;skilled treatment is necessary  -HADLEY     Therapy Frequency (OT) daily  -     Row Name 08/11/22 0944          Therapy Plan Review/Discharge Plan (OT)    Equipment Needs Upon Discharge (OT) --  TBD if can get information from family  -HADLEY     Anticipated Discharge Disposition (OT) home with home health;home with 24/7 care  -HADLEY     Row Name 08/11/22 0944          Vital Signs    Pre Systolic BP Rehab 136  -HADLEY     Pre Treatment Diastolic BP 70  -HADLEY     Post Systolic BP Rehab 153  -HADLEY     Post Treatment Diastolic BP 93  -HADLEY     Pretreatment Heart Rate (beats/min) 64  -HADLEY     Posttreatment Heart Rate (beats/min) 70  -HADLEY     Pre SpO2 (%) 94  -HADLEY     O2 Delivery Pre Treatment nasal cannula  -HADLEY     O2 Delivery Intra Treatment nasal cannula  -HADLEY     Post SpO2 (%) 96  -HADLEY     O2 Delivery Post Treatment nasal cannula  -HADLEY     Pre Patient Position Supine  -HADLEY     Intra Patient Position Standing  -HADLEY     Post Patient Position Sitting  -HADLEY     Row Name 08/11/22 0944          Positioning and Restraints    Pre-Treatment  Position in bed  -HADLEY     Post Treatment Position chair  -HADLEY     In Chair reclined;call light within reach;encouraged to call for assist;exit alarm on;waffle cushion;heels elevated;notified ns  -HADLEY           User Key  (r) = Recorded By, (t) = Taken By, (c) = Cosigned By    Initials Name Provider Type    HADLEY Clarice Warren, OT Occupational Therapist               Outcome Measures     Row Name 08/11/22 0951          How much help from another is currently needed...    Putting on and taking off regular lower body clothing? 3  -HADLEY     Bathing (including washing, rinsing, and drying) 2  -HADLEY     Toileting (which includes using toilet bed pan or urinal) 2  -HADLEY     Putting on and taking off regular upper body clothing 2  -HADLEY     Taking care of personal grooming (such as brushing teeth) 3  -HADLEY     Eating meals 3  -HADLEY     AM-PAC 6 Clicks Score (OT) 15  -HADLEY     Row Name 08/11/22 0827          How much help from another person do you currently need...    Turning from your back to your side while in flat bed without using bedrails? 4  -NS     Moving from lying on back to sitting on the side of a flat bed without bedrails? 3  -NS     Moving to and from a bed to a chair (including a wheelchair)? 3  -NS     Standing up from a chair using your arms (e.g., wheelchair, bedside chair)? 3  -NS     Climbing 3-5 steps with a railing? 2  -NS     To walk in hospital room? 3  -NS     AM-PAC 6 Clicks Score (PT) 18  -NS     Highest level of mobility 6 --> Walked 10 steps or more  -NS     Row Name 08/11/22 0951          Modified [place] Scale    Pre-Stroke Modified [place] Scale 6 - Unable to determine (UTD) from the medical record documentation  -HADLEY     Modified [place] Scale 3 - Moderate disability.  Requiring some help, but able to walk without assistance.  -     Row Name 08/11/22 0951 08/11/22 0827       Functional Assessment    Outcome Measure Options AM-PAC 6 Clicks Daily Activity (OT);Modified [place]  -HADLEY AM-PAC 6 Clicks Basic Mobility  (PT)  -NS          User Key  (r) = Recorded By, (t) = Taken By, (c) = Cosigned By    Initials Name Provider Type    Clarice Roque, OT Occupational Therapist    Yesi Quevedo PT Physical Therapist                Occupational Therapy Education                 Title: PT OT SLP Therapies (In Progress)     Topic: Occupational Therapy (In Progress)     Point: ADL training (In Progress)     Description:   Instruct learner(s) on proper safety adaptation and remediation techniques during self care or transfers.   Instruct in proper use of assistive devices.              Learning Progress Summary           Patient Acceptance, E, NR by HADLEY at 8/11/2022 0952    Comment: reason for consult, re-oriented, walker safety                   Point: Home exercise program (Not Started)     Description:   Instruct learner(s) on appropriate technique for monitoring, assisting and/or progressing therapeutic exercises/activities.              Learner Progress:  Not documented in this visit.          Point: Precautions (In Progress)     Description:   Instruct learner(s) on prescribed precautions during self-care and functional transfers.              Learning Progress Summary           Patient Acceptance, E, NR by HADLEY at 8/11/2022 0952    Comment: reason for consult, re-oriented, walker safety                   Point: Body mechanics (Not Started)     Description:   Instruct learner(s) on proper positioning and spine alignment during self-care, functional mobility activities and/or exercises.              Learner Progress:  Not documented in this visit.                      User Key     Initials Effective Dates Name Provider Type [facility] 06/16/21 -  Clarice Warren, OT Occupational Therapist OT              OT Recommendation and Plan  Planned Therapy Interventions (OT): activity tolerance training, BADL retraining, cognitive/visual perception retraining, functional balance retraining, occupation/activity based interventions,  patient/caregiver education/training, ROM/therapeutic exercise, strengthening exercise, transfer/mobility retraining  Therapy Frequency (OT): daily  Plan of Care Review  Plan of Care Reviewed With: patient  Progress: no change  Outcome Evaluation: OT evaluation completed with pt. demonstrating decreased activity tolerance, cognition and balance impacting BADL independence warranting continued skilled OT services.  Pt. needed min A with bed mobility and CGA to stand and mvmt in room, but needed cues for safety and task initiation at times.  Pt. able to doff and nikko socks and groom sinkside with SBA.  Expect pt. will be able to discharge home with 24/7 supervision. May need a  OT home safety assessment if not done priorly.     Time Calculation:    Time Calculation- OT     Row Name 08/11/22 0953             Time Calculation- OT    OT Start Time 0820  -HADLEY      OT Received On 08/11/22  -HADLEY      OT Goal Re-Cert Due Date 08/21/22  -HADLEY              Untimed Charges    OT Eval/Re-eval Minutes 53  -HADLEY              Total Minutes    Untimed Charges Total Minutes 53  -HADLEY       Total Minutes 53  -HADLEY            User Key  (r) = Recorded By, (t) = Taken By, (c) = Cosigned By    Initials Name Provider Type    Clarice Roque OT Occupational Therapist              Therapy Charges for Today     Code Description Service Date Service Provider Modifiers Qty    [id number] HC OT EVAL MOD COMPLEXITY 4 8/11/2022 Clarice Warren OT GO 1               Clarice Warren OT  8/11/2022

## 2022-08-12 ENCOUNTER — READMISSION MANAGEMENT (OUTPATIENT)
Dept: CALL CENTER | Facility: HOSPITAL | Age: 87
End: 2022-08-12

## 2022-08-12 VITALS
OXYGEN SATURATION: 93 % | RESPIRATION RATE: 18 BRPM | HEIGHT: 72 IN | BODY MASS INDEX: 29.53 KG/M2 | TEMPERATURE: 98.1 F | HEART RATE: 65 BPM | SYSTOLIC BLOOD PRESSURE: 147 MMHG | WEIGHT: 218.03 LBS | DIASTOLIC BLOOD PRESSURE: 79 MMHG

## 2022-08-12 PROBLEM — I48.0 PAROXYSMAL ATRIAL FIBRILLATION: Status: RESOLVED | Noted: 2022-08-10 | Resolved: 2022-08-12

## 2022-08-12 LAB
ALBUMIN SERPL-MCNC: 4 G/DL (ref 3.5–5.2)
ALP SERPL-CCNC: 66 U/L (ref 39–117)
ALT SERPL W P-5'-P-CCNC: 12 U/L (ref 1–41)
ANION GAP SERPL CALCULATED.3IONS-SCNC: 13 MMOL/L (ref 5–15)
AST SERPL-CCNC: 17 U/L (ref 1–40)
BASOPHILS # BLD AUTO: 0.05 10*3/MM3 (ref 0–0.2)
BASOPHILS NFR BLD AUTO: 0.5 % (ref 0–1.5)
BILIRUB CONJ SERPL-MCNC: <0.2 MG/DL (ref 0–0.3)
BILIRUB INDIRECT SERPL-MCNC: NORMAL MG/DL
BILIRUB SERPL-MCNC: 0.3 MG/DL (ref 0–1.2)
BUN SERPL-MCNC: 27 MG/DL (ref 8–23)
BUN/CREAT SERPL: 21.6 (ref 7–25)
CALCIUM SPEC-SCNC: 8.9 MG/DL (ref 8.6–10.5)
CHLORIDE SERPL-SCNC: 105 MMOL/L (ref 98–107)
CO2 SERPL-SCNC: 22 MMOL/L (ref 22–29)
CREAT SERPL-MCNC: 1.25 MG/DL (ref 0.76–1.27)
DEPRECATED RDW RBC AUTO: 44.8 FL (ref 37–54)
EGFRCR SERPLBLD CKD-EPI 2021: 55.7 ML/MIN/1.73
EOSINOPHIL # BLD AUTO: 0.02 10*3/MM3 (ref 0–0.4)
EOSINOPHIL NFR BLD AUTO: 0.2 % (ref 0.3–6.2)
ERYTHROCYTE [DISTWIDTH] IN BLOOD BY AUTOMATED COUNT: 15.1 % (ref 12.3–15.4)
GLUCOSE BLDC GLUCOMTR-MCNC: 231 MG/DL (ref 70–130)
GLUCOSE BLDC GLUCOMTR-MCNC: 249 MG/DL (ref 70–130)
GLUCOSE BLDC GLUCOMTR-MCNC: 281 MG/DL (ref 70–130)
GLUCOSE SERPL-MCNC: 238 MG/DL (ref 65–99)
HCT VFR BLD AUTO: 37 % (ref 37.5–51)
HGB BLD-MCNC: 12.9 G/DL (ref 13–17.7)
IMM GRANULOCYTES # BLD AUTO: 0.07 10*3/MM3 (ref 0–0.05)
IMM GRANULOCYTES NFR BLD AUTO: 0.6 % (ref 0–0.5)
LYMPHOCYTES # BLD AUTO: 1.42 10*3/MM3 (ref 0.7–3.1)
LYMPHOCYTES NFR BLD AUTO: 13.1 % (ref 19.6–45.3)
MCH RBC QN AUTO: 28.5 PG (ref 26.6–33)
MCHC RBC AUTO-ENTMCNC: 34.9 G/DL (ref 31.5–35.7)
MCV RBC AUTO: 81.7 FL (ref 79–97)
MONOCYTES # BLD AUTO: 0.6 10*3/MM3 (ref 0.1–0.9)
MONOCYTES NFR BLD AUTO: 5.6 % (ref 5–12)
NEUTROPHILS NFR BLD AUTO: 8.65 10*3/MM3 (ref 1.7–7)
NEUTROPHILS NFR BLD AUTO: 80 % (ref 42.7–76)
NRBC BLD AUTO-RTO: 0 /100 WBC (ref 0–0.2)
PLATELET # BLD AUTO: 189 10*3/MM3 (ref 140–450)
PMV BLD AUTO: 9.6 FL (ref 6–12)
POTASSIUM SERPL-SCNC: 4.2 MMOL/L (ref 3.5–5.2)
PROT SERPL-MCNC: 7.1 G/DL (ref 6–8.5)
RBC # BLD AUTO: 4.53 10*6/MM3 (ref 4.14–5.8)
SODIUM SERPL-SCNC: 140 MMOL/L (ref 136–145)
WBC NRBC COR # BLD: 10.81 10*3/MM3 (ref 3.4–10.8)

## 2022-08-12 PROCEDURE — 82962 GLUCOSE BLOOD TEST: CPT

## 2022-08-12 PROCEDURE — G0378 HOSPITAL OBSERVATION PER HR: HCPCS

## 2022-08-12 PROCEDURE — 25010000002 REMDESIVIR 100 MG/20ML SOLUTION 1 EACH VIAL: Performed by: PHYSICIAN ASSISTANT

## 2022-08-12 PROCEDURE — 80048 BASIC METABOLIC PNL TOTAL CA: CPT | Performed by: PHYSICIAN ASSISTANT

## 2022-08-12 PROCEDURE — 80076 HEPATIC FUNCTION PANEL: CPT | Performed by: PHYSICIAN ASSISTANT

## 2022-08-12 PROCEDURE — 85025 COMPLETE CBC W/AUTO DIFF WBC: CPT | Performed by: PHYSICIAN ASSISTANT

## 2022-08-12 PROCEDURE — 63710000001 INSULIN LISPRO (HUMAN) PER 5 UNITS: Performed by: HOSPITALIST

## 2022-08-12 PROCEDURE — 99239 HOSP IP/OBS DSCHRG MGMT >30: CPT | Performed by: HOSPITALIST

## 2022-08-12 RX ORDER — PREGABALIN 50 MG/1
50 CAPSULE ORAL 2 TIMES DAILY
Qty: 60 CAPSULE | Refills: 0 | Status: SHIPPED | OUTPATIENT
Start: 2022-08-12 | End: 2022-09-11

## 2022-08-12 RX ORDER — ASPIRIN 325 MG
325 TABLET, DELAYED RELEASE (ENTERIC COATED) ORAL DAILY
Qty: 30 TABLET | Refills: 0 | Status: SHIPPED | OUTPATIENT
Start: 2022-08-13 | End: 2022-09-12

## 2022-08-12 RX ORDER — DONEPEZIL HYDROCHLORIDE 10 MG/1
10 TABLET, FILM COATED ORAL NIGHTLY
Qty: 30 TABLET | Refills: 0 | Status: SHIPPED | OUTPATIENT
Start: 2022-08-12 | End: 2022-09-11

## 2022-08-12 RX ADMIN — Medication 10 ML: at 08:27

## 2022-08-12 RX ADMIN — REMDESIVIR 100 MG: 100 INJECTION, POWDER, LYOPHILIZED, FOR SOLUTION INTRAVENOUS at 08:28

## 2022-08-12 RX ADMIN — Medication 10 ML: at 08:29

## 2022-08-12 RX ADMIN — DONEPEZIL HYDROCHLORIDE 10 MG: 10 TABLET, FILM COATED ORAL at 08:28

## 2022-08-12 RX ADMIN — DEXAMETHASONE 6 MG: 2 TABLET ORAL at 08:28

## 2022-08-12 RX ADMIN — PANTOPRAZOLE SODIUM 40 MG: 40 TABLET, DELAYED RELEASE ORAL at 08:28

## 2022-08-12 RX ADMIN — INSULIN LISPRO 5 UNITS: 100 INJECTION, SOLUTION INTRAVENOUS; SUBCUTANEOUS at 12:20

## 2022-08-12 RX ADMIN — INSULIN LISPRO 2 UNITS: 100 INJECTION, SOLUTION INTRAVENOUS; SUBCUTANEOUS at 08:27

## 2022-08-12 RX ADMIN — INSULIN LISPRO 5 UNITS: 100 INJECTION, SOLUTION INTRAVENOUS; SUBCUTANEOUS at 08:27

## 2022-08-12 RX ADMIN — SERTRALINE HYDROCHLORIDE 150 MG: 100 TABLET ORAL at 08:28

## 2022-08-12 RX ADMIN — ASPIRIN 325 MG: 325 TABLET, COATED ORAL at 08:28

## 2022-08-12 RX ADMIN — INSULIN LISPRO 2 UNITS: 100 INJECTION, SOLUTION INTRAVENOUS; SUBCUTANEOUS at 12:20

## 2022-08-12 NOTE — CASE MANAGEMENT/SOCIAL WORK
Case Management Discharge Note      Final Note: Plan is for pt. to dc home with family. Have arranged For [facility]  to see pt. for SN/PT. Spoke with Gio. Family to provide transport.    Provided Post Acute Provider List?: N/A    Selected Continued Care - Admitted Since 8/10/2022     Destination    No services have been selected for the patient.              Durable Medical Equipment    No services have been selected for the patient.              Dialysis/Infusion    No services have been selected for the patient.              Home Medical Care Coordination complete.    Service Provider Selected Services Address Phone Fax Patient Preferred    [facility]  Home Nursing ,  Home Rehabilitation [address] [place] [address] [phone] --          Therapy    No services have been selected for the patient.              Community Resources    No services have been selected for the patient.              Community & DME    No services have been selected for the patient.                       Final Discharge Disposition Code: 06 - home with home health care

## 2022-08-12 NOTE — NURSING NOTE
"Patient to be discharged home. Daughter updated on POC, instructed to call significant other, Laureano, [phone]. Notified that primary care appt unable to be made due to office being closed. IV and telemetry removed. Pending transport from Laureano.     Upon transport, patient's daughter, Jacquie's significant other, Laureano, informed this RN that patient had home medications in a \"cooler\" Checked pt room, drawers, and medication bin and no home meds found. Called pharmacy and no meds were there. No home meds documented with patient on admission. Instructed Laureano that he would need to check with Jacquie, pts daughter, after her surgery today to verify where home meds would be. Charge RN aware. Laureano verbalized understanding.   "

## 2022-08-12 NOTE — DISCHARGE SUMMARY
[facility] Medicine Services  DISCHARGE SUMMARY    Patient Name: NANCY Benton  : 1934  MRN: [id number]    Date of Admission: 8/10/2022  8:31 PM  Date of Discharge:  2022 (Friday)  Primary Care Physician: Provider, No Known    Consults     Date and Time Order Name Status Description    2022  3:56 AM Inpatient Neurology Consult Stroke Completed     2022  3:56 AM Inpatient Neurology Consult Stroke Completed     8/10/2022  8:33 PM Inpatient Neurology Consult Stroke Completed         Anthony Ribeiro MD - Stroke Neurology    Hospital Course     Presenting Problem:     Resolved symptoms - confusion - facial droop - on opiate therapy with HORACIO  ? Possible TIA ? - CVA ruled out    Active Hospital Problems    Diagnosis  POA   • Essential hypertension [I10]  Yes   • Hyperlipidemia [E78.5]  Yes   • COVID [U07.1]  Yes   • Type 2 diabetes mellitus (HCC) [E11.9]  Yes   • Coronary artery disease [I25.10]  Yes      Resolved Hospital Problems    Diagnosis Date Resolved POA   • Paroxysmal atrial fibrillation (HCC) [I48.0] 2022 Yes      Polypharmacy issues    Hospital Course:  NANCY Benton is a 87 y.o. male with a history of dementia, CAD s/p stents, HTN, HLD, and T2DM who presents to [facility] ED for complaint of right facial droop and slurred speech.     Right facial droop  Dehydration  -Lactate 2.6 (present on admission)  -very high Hemoglobin A1C - months of osmotic diuresis  -Patient currently stable and in no acute distress. VSS on room air  -discussed case with Stroke team  -MRI no stroke  -Echo - normal EF, normal valves  -on tramadol with HORACIO which effects clearance of opioids  -aspirin 325 mg and ZioPatch Monitor at discharge     COVID  -Covid positive.  -He is reportedly fully vaccinated. Family member noted that patient had a mild cough, but is otherwise asymptomatic for Covid.  -Source of infection, leukocytosis, elevated lactate, elevated procal.  -Blood  cultures pending  -Started remdesivir and Decadron  -wants to truncate therapy and go home  -Isolation precautions  -1L NS bolus x2 ordered in the ED  -got IV fluids overnight     CAD s/p stents  HTN  HLD  -Allow permissive HTN per stroke team, SBP <220 on admission  -Continue statin     Dementia  -Aricept 10 mg BID changed to once daily     Uncontrolled DM  -Blood glucose markedly elevated  -months of osmotic diuresis  -insulin SS  -Diabetes Educator  -may need the help of an Endocrinologist     Acute Kidney Injury vs CKD  -Creatinine 1.69 Unclear on his baseline  -eGFR 38.8  -Received 1L Ns bolus x2 in the ED  -Gentle IV fluids given here  -Avoid nephrotoxic agents  -consider stopping tramadol and Meloxicam    Discharge Follow Up Recommendations for outpatient labs/diagnostics:   aspirin 325 mg and ZioPatch at discharge from the Hospital  - consider outpatient Endocrinology    Day of Discharge     HPI:    He wants to go home.  He seems frustrated by being here.  He does not want to continue therapy started on admission for COVID-19.  He says his daughter is having surgery and wants to be home today.    Review of Systems    Gen- denies chills, denies fevers  CV- No chest pain, palpitations  Resp- No cough, dyspnea  GI- No N/V/D, abd pain    Vital Signs:   Temp:  [97 °F (36.1 °C)-98.1 °F (36.7 °C)] 98.1 °F (36.7 °C)  Heart Rate:  [58-65] 65  Resp:  [18] 18  BP: (126-147)/(60-79) 147/79  Flow (L/min):  [2] 2    Physical Exam:    Constitutional: No acute distress, awake, alert  HENT: NCAT, mucous membranes moist  Respiratory: Clear to auscultation bilaterally, respiratory effort normal   Cardiovascular: RRR, no murmurs, rubs, or gallops  Gastrointestinal: Positive bowel sounds, soft, nontender, nondistended  Musculoskeletal: No bilateral ankle edema  Skin: No rashes    Pertinent  and/or Most Recent Results     LAB RESULTS:      Lab 08/12/22  1213 08/11/22  0654 08/10/22  2343 08/10/22  2042 08/10/22  2041  08/10/22  2039   WBC 10.81* 16.29*  --  15.58*  --   --    HEMOGLOBIN 12.9* 13.3  --  13.8  --   --    HEMOGLOBIN, POC  --   --   --   --  13.9  --    HEMATOCRIT 37.0* 38.3  --  40.3  --   --    HEMATOCRIT POC  --   --   --   --  41  --    PLATELETS 189 188  --  174  --   --    NEUTROS ABS 8.65* 14.54*  --  13.08*  --   --    IMMATURE GRANS (ABS) 0.07* 0.08*  --  0.07*  --   --    LYMPHS ABS 1.42 1.29  --  1.37  --   --    MONOS ABS 0.60 0.31  --  0.89  --   --    EOS ABS 0.02 0.00  --  0.06  --   --    MCV 81.7 82.0  --  82.8  --   --    CRP  --   --   --  1.22*  --   --    PROCALCITONIN  --   --   --  0.44*  --   --    LACTATE  --  1.7 2.3* 2.6*  --   --    LDH  --   --   --  154  --   --    PROTIME  --   --   --  13.8  --  13.3   APTT  --   --   --  29.7  --   --    D DIMER QUANT  --   --   --  0.59*  --   --          Lab 08/12/22  1213 08/11/22  0654 08/10/22  2343 08/10/22  2042 08/10/22  2041   SODIUM 140 139  --  137  --    POTASSIUM 4.2 4.8  --  4.6  --    CHLORIDE 105 104  --  102  --    CO2 22.0 22.0  --  22.0  --    ANION GAP 13.0 13.0  --  13.0  --    BUN 27* 24*  --  27*  --    CREATININE 1.25 1.38* 1.53* 1.69* 1.50*   EGFR 55.7* 49.5* 43.7* 38.8* 44.8*   GLUCOSE 238* 295*  --  331*  --    CALCIUM 8.9 9.0  --  9.2  --    HEMOGLOBIN A1C  --  10.60*  --   --   --          Lab 08/12/22  1213 08/11/22  0654 08/10/22  2343 08/10/22  2042   TOTAL PROTEIN 7.1 7.3 6.7 7.2   ALBUMIN 4.00 4.20 3.70 3.80   GLOBULIN  --   --   --  3.4   ALT (SGPT) 12 13 12 13  13   AST (SGOT) 17 12 13 13  13   BILIRUBIN 0.3 0.5 0.3 0.5   BILIRUBIN DIRECT <0.2 <0.2 <0.2  --    ALK PHOS 66 72 63 67         Lab 08/10/22  2042 08/10/22  2039   PROBNP 230.3  --    TROPONIN T <0.010  --    PROTIME 13.8 13.3   INR 1.07 1.1         Lab 08/11/22  0654   CHOLESTEROL 146   LDL CHOL 73   HDL CHOL 37*   TRIGLYCERIDES 215*         Lab 08/10/22  2042   FERRITIN 30.13         Brief Urine Lab Results  (Last result in the past 365 days)      Color    Clarity   Blood   Leuk Est   Nitrite   Protein   CREAT   Urine HCG        08/10/22 2123 Yellow   Clear   Moderate (2+)   Negative   Negative   Negative               Microbiology Results (last 10 days)     Procedure Component Value - Date/Time    Blood Culture - Blood, Arm, Right [[id number]]  (Normal) Collected: 08/10/22 2150    Lab Status: Preliminary result Specimen: Blood from Arm, Right Updated: 08/12/22 2215     Blood Culture No growth at 2 days    Blood Culture - Blood, Arm, Left [[id number]]  (Normal) Collected: 08/10/22 2145    Lab Status: Preliminary result Specimen: Blood from Arm, Left Updated: 08/12/22 2215     Blood Culture No growth at 2 days    COVID PRE-OP / PRE-PROCEDURE SCREENING ORDER (NO ISOLATION) - Swab, Nasopharynx [[id number]]  (Abnormal) Collected: 08/10/22 2122    Lab Status: Final result Specimen: Swab from Nasopharynx Updated: 08/10/22 2214    Narrative:      The following orders were created for panel order COVID PRE-OP / PRE-PROCEDURE SCREENING ORDER (NO ISOLATION) - Swab, Nasopharynx.  Procedure                               Abnormality         Status                     ---------                               -----------         ------                     COVID-19 and FLU A/B PCR...[[id number]]  Abnormal            Final result                 Please view results for these tests on the individual orders.    COVID-19 and FLU A/B PCR - Swab, Nasopharynx [[id number]]  (Abnormal) Collected: 08/10/22 2122    Lab Status: Final result Specimen: Swab from Nasopharynx Updated: 08/10/22 2214     COVID19 Detected     Influenza A PCR Not Detected     Influenza B PCR Not Detected    Narrative:      Fact sheet for providers: https://www.fda.gov/media/147170/download    Fact sheet for patients: https://www.fda.gov/media/039223/download    Test performed by PCR.  Influenza A and Influenza B negative results should be considered presumptive in samples that have a positive SARS-CoV-2  result.    Competitive inhibition studies showed that SARS-CoV-2 virus, when present at concentrations above 3.6E+04 copies/mL, can inhibit the detection and amplification of influenza A and influenza B virus RNA if present at or below 1.8E+02 copies/mL or 4.9E+02 copies/mL, respectively, and may lead to false negative influenza virus results. If co-infection with influenza A or influenza B virus is suspected in samples with a positive SARS-CoV-2 result, the sample should be re-tested with another FDA cleared, approved, or authorized influenza test, if influenza virus detection would change clinical management.          Adult Transthoracic Echo Complete w/ Color, Spectral and Contrast if Necessary Per Protocol    Result Date: 8/11/2022  · Left ventricular ejection fraction appears to be 56 - 60%. Left ventricular systolic function is normal. · The cardiac valves are functionally within normal limits.      CT Angiogram Neck    Result Date: 8/10/2022  DATE OF EXAM: 8/10/2022 8:37 PM  PROCEDURE: CT CEREBRAL PERFUSION W WO CONTRAST-, CT ANGIOGRAM NECK-, CT ANGIOGRAM HEAD W AI ANALYSIS OF LVO-  INDICATIONS: Neuro deficit, acute, stroke suspected  COMPARISON: Noncontrast CT head performed concurrently  TECHNIQUE: Routine transaxial cuts were obtained through the head without administration of contrast. Routine transaxial cuts were then obtained through the head following the intravenous administration of 115 mL of Isovue 370. Core blood volume, core blood flow, mean transit time, and Tmax images were obtained utilizing the Rapid software protocol. A limited CT angiogram of the head was also performed to measure the blood vessel density.  Axial IV arterial phase contrast-enhanced CT angiogram of the head and neck. Three-dimensional reconstructions were postprocessed.  AI analysis of LVO was also performed  The radiation dose reduction device was turned on for each scan per the ALARA (As Low as Reasonably Achievable)  protocol.  FINDINGS: CT perfusion: CT perfusion demonstrates no evidence of core infarct or significant territorial ischemic tissue at risk.  CT ANGIOGRAM: The lung apices are grossly clear. Evaluation of the neck soft tissues demonstrates no pathologic cervical adenopathy or unexpected soft tissue neck mass. The osseous structures demonstrate no evidence of acute fracture or aggressive osseous lesion. On the right, there is no significant atherosclerotic narrowing of the ICA origin, 0% by NASCET criteria. Similar 0% narrowing is present on the left. The vertebral arteries are normal in course and caliber, minimally diminutive on the left. Intracranially, the carotid siphons demonstrate multifocal moderate calcific atherosclerotic change, with some focal moderate narrowing of the left cavernous segment. The anterior cerebral arteries are normal in course and caliber. The right middle cerebral artery demonstrates no evidence of flow-limiting stenosis, large vessel occlusion or aneurysmal dilatation. The left middle cerebral artery is also normal in course and caliber. There is some focal narrowing of the diminutive left intradural vertebral artery. Patent basilar artery and right vertebral artery. The posterior cerebral arteries are normal in course and caliber bilaterally.      CT perfusion demonstrates no evidence of core infarct or significant territorial ischemic tissue at risk.  CT angiogram demonstrates no evidence of high-grade flow limiting stenosis, large vessel occlusion or aneurysmal dilatation. There is focal moderate narrowing of the left intracranial ICA, in addition to focal moderate to severe narrowing of the otherwise diminutive intradural left vertebral artery.  This report was finalized on 8/10/2022 9:06 PM by Jeff Aguilar.      MRI Brain Without Contrast    Result Date: 8/11/2022  Examination: MRI BRAIN WO CONTRAST-  Date of Exam: 8/11/2022 1:15 AM  Indication: Stroke, follow up;  I63.9-Cerebral infarction, unspecified; R29.810-Facial weakness; R47.81-Slurred speech; D72.825-Bandemia; U07.1-COVID-19.  Comparison: Correlation with CT head and CT angiogram head and neck 8/10/2022.  Technique: Standard non-contrast MR pulse sequences of the brain were obtained.  Findings: There is no diffusion restriction to suggest acute infarct. There is no evidence of acute or chronic intracranial hemorrhage.  There is patchy periventricular and scattered patchy and punctate foci of FLAIR hyperintense signal within the cerebral white matter. There is generalized parenchymal volume loss congruent with age. There are prominent perivascular spaces present throughout the basal ganglia. No mass effect or midline shift. No abnormal extra-axial collections.  The major vascular flow voids appear intact. The basal ganglia, brainstem and cerebellum appear within normal limits.  Calvarial and superficial soft tissue signal is within normal limits.  There is evidence of prior lens replacement surgery. The paranasal sinuses and the mastoid air cells appear well aerated.  Midline structures are intact.       1. No acute intracranial abnormality. 2. Generalized parenchymal volume loss and moderate chronic small vessel ischemic change.  This report was finalized on 8/11/2022 8:35 AM by Hema Montilla MD.      XR Chest 1 View    Result Date: 8/10/2022  DATE OF EXAM: 8/10/2022 8:45 PM  PROCEDURE: XR CHEST 1 VW-  INDICATIONS: Acute Stroke Protocol (onset < 12 hrs)  COMPARISON: No comparisons available.  TECHNIQUE: Single radiographic AP view of the chest was obtained.  FINDINGS: Moderate chronic changes of the lung fields are present without focal airspace opacity. There is no significant pleural effusion or distinct pneumothorax. The heart appears enlarged.      Moderate chronic interstitial changes of the lung fields without evidence of acute cardiopulmonary abnormality.  This report was finalized on 8/10/2022 9:40 PM by  Jeff Aguilar.      CT Head Without Contrast Stroke Protocol    Result Date: 8/10/2022  DATE OF EXAM: 8/10/2022 8:32 PM  PROCEDURE: CT HEAD WO CONTRAST STROKE PROTOCOL-  INDICATIONS: Neuro deficit, acute, stroke suspected  COMPARISON: No comparisons available.  TECHNIQUE: Routine transaxial and coronal reconstruction images were obtained through the head without the administration of contrast. Automated exposure control and iterative reconstruction methods were used.  The radiation dose reduction device was turned on for each scan per the ALARA (As Low as Reasonably Achievable) protocol.  FINDINGS: Gray-white differentiation is maintained and there is no evidence of intracranial hemorrhage, mass or mass effect. Age-related changes of the brain are present including volume loss and typical periventricular sequela of chronic small vessel ischemia. There is otherwise no evidence of intracranial hemorrhage, mass or mass effect. The ventricles are normal in size and configuration accounting for surrounding volume loss. The orbits are normal and the paranasal sinuses are grossly clear.      Age-related changes of the brain as above, otherwise without evidence of acute intracranial abnormality.  Scan performed 8:32 PM 8/10/2022, results related to the stroke team via the CT technologist by Jeff Aguilar at 8:37 PM same day   This report was finalized on 8/10/2022 8:38 PM by Jeff Aguilar.      CT Angiogram Head w AI Analysis of LVO    Result Date: 8/10/2022  DATE OF EXAM: 8/10/2022 8:37 PM  PROCEDURE: CT CEREBRAL PERFUSION W WO CONTRAST-, CT ANGIOGRAM NECK-, CT ANGIOGRAM HEAD W AI ANALYSIS OF LVO-  INDICATIONS: Neuro deficit, acute, stroke suspected  COMPARISON: Noncontrast CT head performed concurrently  TECHNIQUE: Routine transaxial cuts were obtained through the head without administration of contrast. Routine transaxial cuts were then obtained through the head following the intravenous administration of 115 mL  of Isovue 370. Core blood volume, core blood flow, mean transit time, and Tmax images were obtained utilizing the Rapid software protocol. A limited CT angiogram of the head was also performed to measure the blood vessel density.  Axial IV arterial phase contrast-enhanced CT angiogram of the head and neck. Three-dimensional reconstructions were postprocessed.  AI analysis of LVO was also performed  The radiation dose reduction device was turned on for each scan per the ALARA (As Low as Reasonably Achievable) protocol.  FINDINGS: CT perfusion: CT perfusion demonstrates no evidence of core infarct or significant territorial ischemic tissue at risk.  CT ANGIOGRAM: The lung apices are grossly clear. Evaluation of the neck soft tissues demonstrates no pathologic cervical adenopathy or unexpected soft tissue neck mass. The osseous structures demonstrate no evidence of acute fracture or aggressive osseous lesion. On the right, there is no significant atherosclerotic narrowing of the ICA origin, 0% by NASCET criteria. Similar 0% narrowing is present on the left. The vertebral arteries are normal in course and caliber, minimally diminutive on the left. Intracranially, the carotid siphons demonstrate multifocal moderate calcific atherosclerotic change, with some focal moderate narrowing of the left cavernous segment. The anterior cerebral arteries are normal in course and caliber. The right middle cerebral artery demonstrates no evidence of flow-limiting stenosis, large vessel occlusion or aneurysmal dilatation. The left middle cerebral artery is also normal in course and caliber. There is some focal narrowing of the diminutive left intradural vertebral artery. Patent basilar artery and right vertebral artery. The posterior cerebral arteries are normal in course and caliber bilaterally.      CT perfusion demonstrates no evidence of core infarct or significant territorial ischemic tissue at risk.  CT angiogram demonstrates no  evidence of high-grade flow limiting stenosis, large vessel occlusion or aneurysmal dilatation. There is focal moderate narrowing of the left intracranial ICA, in addition to focal moderate to severe narrowing of the otherwise diminutive intradural left vertebral artery.  This report was finalized on 8/10/2022 9:06 PM by Jeff Aguilar.      CT CEREBRAL PERFUSION WITH & WITHOUT CONTRAST    Result Date: 8/10/2022  DATE OF EXAM: 8/10/2022 8:37 PM  PROCEDURE: CT CEREBRAL PERFUSION W WO CONTRAST-, CT ANGIOGRAM NECK-, CT ANGIOGRAM HEAD W AI ANALYSIS OF LVO-  INDICATIONS: Neuro deficit, acute, stroke suspected  COMPARISON: Noncontrast CT head performed concurrently  TECHNIQUE: Routine transaxial cuts were obtained through the head without administration of contrast. Routine transaxial cuts were then obtained through the head following the intravenous administration of 115 mL of Isovue 370. Core blood volume, core blood flow, mean transit time, and Tmax images were obtained utilizing the Rapid software protocol. A limited CT angiogram of the head was also performed to measure the blood vessel density.  Axial IV arterial phase contrast-enhanced CT angiogram of the head and neck. Three-dimensional reconstructions were postprocessed.  AI analysis of LVO was also performed  The radiation dose reduction device was turned on for each scan per the ALARA (As Low as Reasonably Achievable) protocol.  FINDINGS: CT perfusion: CT perfusion demonstrates no evidence of core infarct or significant territorial ischemic tissue at risk.  CT ANGIOGRAM: The lung apices are grossly clear. Evaluation of the neck soft tissues demonstrates no pathologic cervical adenopathy or unexpected soft tissue neck mass. The osseous structures demonstrate no evidence of acute fracture or aggressive osseous lesion. On the right, there is no significant atherosclerotic narrowing of the ICA origin, 0% by NASCET criteria. Similar 0% narrowing is present on the  left. The vertebral arteries are normal in course and caliber, minimally diminutive on the left. Intracranially, the carotid siphons demonstrate multifocal moderate calcific atherosclerotic change, with some focal moderate narrowing of the left cavernous segment. The anterior cerebral arteries are normal in course and caliber. The right middle cerebral artery demonstrates no evidence of flow-limiting stenosis, large vessel occlusion or aneurysmal dilatation. The left middle cerebral artery is also normal in course and caliber. There is some focal narrowing of the diminutive left intradural vertebral artery. Patent basilar artery and right vertebral artery. The posterior cerebral arteries are normal in course and caliber bilaterally.      CT perfusion demonstrates no evidence of core infarct or significant territorial ischemic tissue at risk.  CT angiogram demonstrates no evidence of high-grade flow limiting stenosis, large vessel occlusion or aneurysmal dilatation. There is focal moderate narrowing of the left intracranial ICA, in addition to focal moderate to severe narrowing of the otherwise diminutive intradural left vertebral artery.  This report was finalized on 8/10/2022 9:06 PM by Jeff Aguilar.                Results for orders placed during the hospital encounter of 08/10/22    Adult Transthoracic Echo Complete w/ Color, Spectral and Contrast if Necessary Per Protocol    Interpretation Summary  · Left ventricular ejection fraction appears to be 56 - 60%. Left ventricular systolic function is normal.  · The cardiac valves are functionally within normal limits.      Pending Labs     Order Current Status    Blood Culture - Blood, Arm, Left Preliminary result    Blood Culture - Blood, Arm, Right Preliminary result        Discharge Details        Discharge Medications      New Medications      Instructions Start Date   aspirin  MG tablet   325 mg, Oral, Daily   Start Date: August 13, 2022        Changes  to Medications      Instructions Start Date   donepezil 10 MG tablet  Commonly known as: Aricept  What changed: when to take this   10 mg, Oral, Nightly      pregabalin 50 MG capsule  Commonly known as: LYRICA  What changed:   · medication strength  · how much to take   50 mg, Oral, 2 Times Daily         Continue These Medications      Instructions Start Date   amLODIPine 10 MG tablet  Commonly known as: NORVASC   10 mg, Oral, Daily      dapagliflozin 5 MG tablet tablet  Commonly known as: FARXIGA   10 mg, Oral, Daily      glipizide 10 MG 24 hr tablet  Commonly known as: GLUCOTROL XL   10 mg, Oral, Daily      hydrALAZINE 50 MG tablet  Commonly known as: APRESOLINE   50 mg, Oral, 2 Times Daily      metFORMIN 500 MG tablet  Commonly known as: GLUCOPHAGE   500 mg, Oral, 2 Times Daily With Meals      metoprolol tartrate 100 MG tablet  Commonly known as: LOPRESSOR   100 mg, Oral, Daily      pantoprazole 40 MG EC tablet  Commonly known as: PROTONIX   40 mg, Oral, Daily      pravastatin 40 MG tablet  Commonly known as: PRAVACHOL   40 mg, Oral, Daily      QUEtiapine 50 MG tablet  Commonly known as: SEROquel   50 mg, Oral, Nightly      sertraline 100 MG tablet  Commonly known as: ZOLOFT   150 mg, Oral, Daily      traMADol 50 MG tablet  Commonly known as: ULTRAM   50 mg, Oral, Every 8 Hours PRN      valsartan 160 MG tablet  Commonly known as: DIOVAN   320 mg, Oral, Daily         Stop These Medications    meloxicam 7.5 MG tablet  Commonly known as: MOBIC     mirtazapine 30 MG tablet  Commonly known as: REMERON          No Known Allergies    Discharge Disposition:  Home or Self Care    Diet:  Hospital:  No active diet order    Activity:   As tolerated    Restrictions or Other Recommendations:  Patient wants to truncate therapy for COVID-19 and go home as soon as possible.  I advised patient that I would let him go home but he has to promise to return to the ER if his clinical condition worsens.       CODE STATUS:    Code Status  and Medical Interventions:   Ordered at: 08/10/22 2319     Medical Intervention Limits:    NO intubation (DNI)     Code Status (Patient has no pulse and is not breathing):    No CPR (Do Not Attempt to Resuscitate)     Medical Interventions (Patient has pulse or is breathing):    Limited Support       Future Appointments   Date Time Provider Department Center   8/19/2022 12:30 PM Onur Arizmendi APRN [facility]       Additional Instructions for the Follow-ups that You Need to Schedule     Discharge Follow-up with PCP   As directed       Currently Documented PCP:    Provider, No Known    PCP Phone Number:    None     Follow Up Details: Primary Care Provider - 1 week - monitor kidney function - may need referral to Endocrinology for Diabetes - blood pressure - outpatient referral for Polysomnography - 1 week             A number of his home medications were modified or discontinued as appropriate due to several factors at discharge.  He had acute kidney injury or change in his GFR and this may have contributed to his presentation / hospitalization.    Additionally, there were several automated pop up Pharmacy warning about too high of dose or too frequent of administration of some meds, for example Pregabalin.  He will need primary care follow up for a number of issues including uncontrolled blood glucose and medication side effects.  Another example is changing Aricept from 10 mg BID to 10 mg once per day.    Kadeem Bashir MD  08/12/22      Time Spent on Discharge:  I spent  44  minutes on this discharge activity which included: face-to-face encounter with the patient, reviewing the data in the system, coordination of the care with the nursing staff as well as consultants, documentation, and entering orders.

## 2022-08-12 NOTE — PLAN OF CARE
Goal Outcome Evaluation:  Plan of Care Reviewed With: patient   Pt is alert and oriented x 1-2, forgetful and confusion. Denies pain. NIH 4 with no changes. No current skin issues. Tele monitor shows NSR. Sp02 noted % on 2L. Pt confused and wanting to go home at start of the shift, attempting to get out of bed multiple times this shift, redirected.                 Progress: no change

## 2022-08-12 NOTE — PROGRESS NOTES
Verified address via nurse at hospital in patients room. P/C to daughter and message left. P/C to PCP Lynnette OLSON and her office and closed and will not be able to reach her until Monday AM to ask about HH orders.--Daughter is presently in hospital so call [facility] [phone] to schedule HH visits--Gio BERMAN([facility])Hospital Liaison

## 2022-08-12 NOTE — CONSULTS
"Diabetes Education    Patient Name:  NANCY Benton  YOB: 1934  MRN: [id number]  Admit Date:  8/10/2022        Following pt for diabetes education consult per stroke protocol and BG >200. Chart reviewed and noted pt's history of dementia, confusion at baseline, and current condition per chart documentation. Also noted per  note from 8/11 pt's daughter and primary caregiver is also in the hospital. Phone call to pt's daughter Jacquie who confirms she is currently a pt here at [facility]. She shares that pt has been taking metformin and glipizide but on Monday of this week PCP prescribed farxiga and pt took first dose Monday. Dtr manages pt's medications and reports pt must be prompted to take his medications which she sets out for him. Sometimes she has to help pt get his pills to his mouth. She notes that prior to this dose of farxiga, AM fasting blood sugars were upper 300's to low 400's, but Tuesday morning fasting blood sugar was low 300's. She states pt does not miss any doses of his medication. PCP provided 30 day samples of farxiga, dtr has not been notified by pharmacy yet of pt's out of pocket cost; metformin and glipizide are both affordable for pt. Dtr states pt has working glucose meter and adequate testing supplies; she checks pt's blood sugar every morning fasting, he does not do this task independently.   Dtr states pt has been \"eating non stop\" and PCP explained to her this could be related to many things, one of which is hyperglycemia. Reviewed s/sx of hyperglycemia and why hunger can occur. Dtr states pt mostly drinks water, but he loves pepsi and she gives him 1/2 cup of pepsi with his lunch and with his dinner. Dtr has been trying to research some nutrition information for diabetes but needs help w/ this information. She would be interested in seeing outpatient diabetes education for her dad once they are both home and recovered. We discussed, given pt's age and comorbidities, " "balancing both quality of life with his meals and food as well as need for glucose control. Daughter is very interested in this balance, she wants her dad to have quality of life and is interested in level of glucose control that provides pt both comfort/freedom from hyperglycemia symptoms but also wants pt to enjoy the food and drink items he loves within reason. Discussed portion control, balanced plate, consistent carbs and how she can use these ideas to help w/ finding this balance. Also discussed goals of glucose control per ADA guidelines given pt's age and comorbidities as well as need to minimize risk for hypoglycemia with medication choices.   Dtr agreed I can mail requested info to pt's home address which is dtr's home. Mailed [facility] \"Diabetes Basics\", Adri nutrition handouts on plate method, and outpatient diabetes education contact info. Dtr agrees I can call to follow up w/ she and pt after discharge, will follow up. Thank you for the consult.                   Electronically signed by:  Renee Tai RN, [facility]  08/12/22 11:00 EDT  "

## 2022-08-12 NOTE — OUTREACH NOTE
Prep Survey    Flowsheet Row Responses   [facility] facility patient discharged from? [place]   Is LACE score < 7 ? Yes   Emergency Room discharge w/ pulse ox? No   Eligibility Not Eligible   What are the reasons patient is not eligible? Other   Does the patient have one of the following disease processes/diagnoses(primary or secondary)? Other   Prep survey completed? Yes          JAMMIE BISHOP - Registered Nurse

## 2022-08-15 ENCOUNTER — HOME HEALTH ADMISSION (OUTPATIENT)
Dept: HOME HEALTH SERVICES | Facility: HOME HEALTHCARE | Age: 87
End: 2022-08-15

## 2022-08-15 LAB
BACTERIA SPEC AEROBE CULT: NORMAL
BACTERIA SPEC AEROBE CULT: NORMAL

## 2022-08-17 ENCOUNTER — HOME CARE VISIT (OUTPATIENT)
Dept: HOME HEALTH SERVICES | Facility: HOME HEALTHCARE | Age: 87
End: 2022-08-17

## 2022-08-17 VITALS
RESPIRATION RATE: 16 BRPM | HEART RATE: 60 BPM | OXYGEN SATURATION: 96 % | DIASTOLIC BLOOD PRESSURE: 62 MMHG | TEMPERATURE: 97.6 F | SYSTOLIC BLOOD PRESSURE: 118 MMHG

## 2022-08-17 PROCEDURE — G0495 RN CARE TRAIN/EDU IN HH: HCPCS

## 2022-08-17 NOTE — HOME HEALTH
SOC Note:    Home Health ordered for: disciplines SN/PT    Reason for Hosp/Primary Dx/Co-morbidities: Hemiplegia in the R side possible stroke, Determined to be most liekly a TIA,  COVID positive on 8.10.22  Hx of HTN, A fib, CAD and DM and Dementia  Focus of Care: TEach caregivers s/s of CVA and actions to take, Monitor resp status related to COVID and teach prevention of infection spread, Diabetic education, Fall prevention education.      Current Functional status/mobility/DME:  Poor gait, poor balance, unsafe use of walker related to dementia.  Frequent falls according to daughter.  Has a new rollator    HB status/Living Arrangements Patient requries assist of 1 , use of walker and freuqent rest periods in order to leave home along with 24 hour supervision     Skin Integrity/wound status: na    Code Status: DNR    Fall Risk: [facility] of 8  daughter states pt falls frequent   POC confirmed with  Lynnette OLSON via inbasket message

## 2022-08-18 ENCOUNTER — HOME CARE VISIT (OUTPATIENT)
Dept: HOME HEALTH SERVICES | Facility: HOME HEALTHCARE | Age: 87
End: 2022-08-18

## 2022-08-18 VITALS
TEMPERATURE: 97.3 F | OXYGEN SATURATION: 97 % | HEART RATE: 55 BPM | DIASTOLIC BLOOD PRESSURE: 84 MMHG | SYSTOLIC BLOOD PRESSURE: 146 MMHG

## 2022-08-18 PROCEDURE — G0151 HHCP-SERV OF PT,EA 15 MIN: HCPCS

## 2022-08-18 NOTE — PROGRESS NOTES
Mode of visit: Telephone  Location of patient: Home   You have chosen to receive care through the use of telemedicine. Telemedicine enables health care providers at different locations to provide safe, effective, and convenient care through the use of technology. As with any health care service, there are risks associated with the use of telemedicine, including equipment failure, poor connections, and  issues.  • Do you understand the risks and benefits of telemedicine as I have explained them to you? Yes  • Have your questions regarding telemedicine been answered? Yes  • Do you consent to the use of telemedicine in your medical care today? Yes      Chief Complaint  Hospital Follow Up Visit    [facility]  Telemedicine note    This was a telephone enabled telemedicine encounter.    Subjective    History of Present Illness {CC  Problem List  Visit  Diagnosis   Encounters  Notes  Medications  Labs  Result Review Imaging  Media :23}     LH Benton, 87 y.o. male with history of dementia, CAD s/p stents, HTN, HLD, and T2DM presents to [facility] telemedicine visit for Hospital Follow Up Visit     Patient recently evaluated at [facility] ED for complaint of right facial droop and slurred speech.  Patient was found to have lactate 2.6, very high hemoglobin A1c.  MRI with no acute infarct, CTA with moderate narrowing of the left intracranial ICA.  TTE with normal LVEF, normal cardiac valves.  Patient was also found to be positive for COVID-19.  ASA and cardiac monitor recommended at discharge from hospital; neurology note indicates patient has a history of frequent falls with falling almost every day and declined anticoagulation. Cardiac monitor was not placed prior to discharge.    Patient presents today feeling well overall from a cardiovascular standpoint. SBP has been running high since discharge with SBP generally 170-180. Denies  chest pain, dsypnea, palpitation/tachypalpitation, syncope. Daughter assists in care.      Objective     Vital Signs:   Vitals:    08/19/22 1235   BP: 174/87   Pulse: 58   SpO2: 96%     There is no height or weight on file to calculate BMI.  Physical Exam  Vitals reviewed.   Constitutional:       Comments: Reported by daughter     Pulmonary:      Effort: Pulmonary effort is normal. No respiratory distress.   Neurological:      Mental Status: He is alert.   Psychiatric:         Mood and Affect: Mood normal.         Behavior: Behavior normal.         Thought Content: Thought content normal.        Data Reviewed:{ Labs  Result Review  Imaging  Med Tab  Media :23}     ECG 12 Lead (08/10/2022 23:03)  Adult Transthoracic Echo Complete w/ Color, Spectral and Contrast if Necessary Per Protocol (08/11/2022 14:35)  Discharge Summary by Kadeem Bashir MD (08/12/2022 13:13)  CBC & Differential (08/12/2022 12:13)  Basic Metabolic Panel (08/12/2022 12:13)  Lipid Panel (08/11/2022 06:54)        Assessment and Plan {CC Problem List  Visit Diagnosis  ROS  Review (Popup)  Health Maintenance  Quality  BestPractice  Medications  SmartSets  SnapShot Encounters  Media :23}     This visit has been scheduled as a telephone visit to comply with patient safety concerns in accordance with CDC recommendations. Time of discussion: 24 minutes.    1. Essential hypertension  -Reports SBP generally 170-180 on home monitoring  -Increase hydralazine to 75mg TID (prev 50mg BID)  -Continue metoprolol, amlodipine, valsartan as prescribed  -Transition metoprolol to BID at next visit.     2. Right facial droop  -Neurology evaluated while inpatient  -CTA showed moderate narrowing of the left intracranial ICA, MRI brain showed no acute infarct.  -TTE with normal LV function, cardiac valves overall normal.  -Daughter deferred AC due to daily falls  -Zio patch recommended at discharge, not placed while inpatient  -Return to clinic in ~2 weeks  for monitor placement  -Continue ASA  -Refer to cardiology after monitor placement    3. CAD  -Denies anginal symptoms  -TTE as above  -Continue ASA, statin, metoprolol    Follow Up {Instructions Charge Capture  Follow-up Communications :23}   Return in about 2 weeks (around 9/2/2022) for Office follow-up, BP check, monitor placement.      Patient was given instructions and counseling regarding his condition or for health maintenance advice. Please see specific information pulled into the AVS if appropriate.  Patient was instructed to call the [facility] with any questions, concerns, or worsening symptoms.    *Please note that portions of this note were completed with a voice recognition program. Efforts were made to edit the dictations, but occasionally words are mistranscribed.

## 2022-08-18 NOTE — HOME HEALTH
PT Evaluation note:  Home Health ordered for: disciplines SN/PT   Reason for Hosp/Primary Dx/Co-morbidities: Hemiplegia in the R side possible stroke, Determined to be most liekly a TIA, COVID positive on 8.10.22 Hx of HTN, A fib, CAD and DM and Dementia   Focus of Care: skilled HHPT 1wk4 for gait training, therapeutic exercise, balance training, HEP instruction/Pt education  Current Functional status/mobility/DME: Poor gait, poor balance, unsafe use of walker related to dementia. Frequent falls according to daughter. Has a new rollator   HB status/Living Arrangements: Patient requries assist of 1 , use of walker and freuqent rest periods in order to leave home along with 24 hour supervision   Skin Integrity/wound status: na   Code Status: DNR   Fall Risk: high per Miguelina

## 2022-08-19 ENCOUNTER — OFFICE VISIT (OUTPATIENT)
Dept: CARDIOLOGY | Facility: HOSPITAL | Age: 87
End: 2022-08-19

## 2022-08-19 VITALS — HEART RATE: 58 BPM | DIASTOLIC BLOOD PRESSURE: 87 MMHG | SYSTOLIC BLOOD PRESSURE: 174 MMHG | OXYGEN SATURATION: 96 %

## 2022-08-19 DIAGNOSIS — I10 ESSENTIAL HYPERTENSION: Primary | ICD-10-CM

## 2022-08-19 PROCEDURE — 99443 PR PHYS/QHP TELEPHONE EVALUATION 21-30 MIN: CPT | Performed by: NURSE PRACTITIONER

## 2022-08-19 RX ORDER — HYDRALAZINE HYDROCHLORIDE 50 MG/1
75 TABLET, FILM COATED ORAL 3 TIMES DAILY
Qty: 135 TABLET | Refills: 0 | Status: SHIPPED | OUTPATIENT
Start: 2022-08-19 | End: 2022-09-18

## 2022-08-25 ENCOUNTER — HOME CARE VISIT (OUTPATIENT)
Dept: HOME HEALTH SERVICES | Facility: HOME HEALTHCARE | Age: 87
End: 2022-08-25

## 2022-08-25 VITALS
SYSTOLIC BLOOD PRESSURE: 138 MMHG | RESPIRATION RATE: 16 BRPM | DIASTOLIC BLOOD PRESSURE: 68 MMHG | HEART RATE: 60 BPM | OXYGEN SATURATION: 97 % | TEMPERATURE: 97.5 F

## 2022-08-25 VITALS
HEART RATE: 78 BPM | DIASTOLIC BLOOD PRESSURE: 66 MMHG | OXYGEN SATURATION: 96 % | RESPIRATION RATE: 16 BRPM | TEMPERATURE: 97 F | SYSTOLIC BLOOD PRESSURE: 120 MMHG

## 2022-08-25 PROCEDURE — G0495 RN CARE TRAIN/EDU IN HH: HCPCS

## 2022-08-25 PROCEDURE — G0151 HHCP-SERV OF PT,EA 15 MIN: HCPCS

## 2022-08-25 NOTE — HOME HEALTH
upon arrival to home  pt siting outside , declining to come inside.  His daughter present.  Visited with his daughter and pt.   She states he is taking his medicaitons and she is monitoring blood sugars and blood pressures which are stable.  Denies any falls currently.  Pt is declining any futher nursing visits and daughter agrees.     Pt discharged from nursing services and home care as therapy discahrged him earlier today

## 2022-08-26 ENCOUNTER — EDUCATION (OUTPATIENT)
Dept: DIABETES SERVICES | Facility: HOSPITAL | Age: 87
End: 2022-08-26

## 2022-08-26 NOTE — HOME HEALTH
PT discipline D/C Visit Note:    Skill/education provided: ther ex for LE strength and balance, gait training, pt education    Patient/caregiver response: pt tolerates activity without c/o increased pain    Plan for next visit: n/a    Other pertinent info: discipline d/c completed

## 2022-08-26 NOTE — CONSULTS
Diabetes Education    Patient Name:  NANCY Benton  YOB: 1934  MRN: [id number]  Admit Date:  (Not on file)        Diabetes education follow up phone call; had spoken w/ pt's daughter while pt was admitted and planned follow up phone call. Dtr states pt's blood sugars are gradually decreasing since he has been home. Home health is following. Dtr does not have any questions or needs at this time.       Electronically signed by:  Renee Tai RN, [facility]  08/26/22 10:34 EDT